# Patient Record
Sex: FEMALE | Race: ASIAN | Employment: UNEMPLOYED
[De-identification: names, ages, dates, MRNs, and addresses within clinical notes are randomized per-mention and may not be internally consistent; named-entity substitution may affect disease eponyms.]

---

## 2023-12-14 ENCOUNTER — OFFICE VISIT (OUTPATIENT)
Facility: CLINIC | Age: 68
End: 2023-12-14
Payer: MEDICARE

## 2023-12-14 VITALS
DIASTOLIC BLOOD PRESSURE: 77 MMHG | SYSTOLIC BLOOD PRESSURE: 162 MMHG | OXYGEN SATURATION: 94 % | RESPIRATION RATE: 16 BRPM | TEMPERATURE: 97.7 F | HEART RATE: 81 BPM | BODY MASS INDEX: 25.52 KG/M2 | HEIGHT: 60 IN | WEIGHT: 130 LBS

## 2023-12-14 DIAGNOSIS — E61.1 IRON DEFICIENCY: ICD-10-CM

## 2023-12-14 DIAGNOSIS — I10 PRIMARY HYPERTENSION: ICD-10-CM

## 2023-12-14 DIAGNOSIS — I48.0 PAROXYSMAL A-FIB (HCC): ICD-10-CM

## 2023-12-14 DIAGNOSIS — E55.9 VITAMIN D DEFICIENCY: ICD-10-CM

## 2023-12-14 DIAGNOSIS — I50.812 CHRONIC RIGHT-SIDED CONGESTIVE HEART FAILURE (HCC): ICD-10-CM

## 2023-12-14 DIAGNOSIS — I50.9 CHRONIC CONGESTIVE HEART FAILURE, UNSPECIFIED HEART FAILURE TYPE (HCC): ICD-10-CM

## 2023-12-14 DIAGNOSIS — M32.9 SYSTEMIC LUPUS ERYTHEMATOSUS, UNSPECIFIED SLE TYPE, UNSPECIFIED ORGAN INVOLVEMENT STATUS (HCC): ICD-10-CM

## 2023-12-14 DIAGNOSIS — Z78.0 ASYMPTOMATIC MENOPAUSAL STATE: ICD-10-CM

## 2023-12-14 DIAGNOSIS — N18.6 ESRD (END STAGE RENAL DISEASE) (HCC): ICD-10-CM

## 2023-12-14 DIAGNOSIS — E11.22 TYPE 2 DIABETES MELLITUS WITH CHRONIC KIDNEY DISEASE, WITHOUT LONG-TERM CURRENT USE OF INSULIN, UNSPECIFIED CKD STAGE (HCC): ICD-10-CM

## 2023-12-14 DIAGNOSIS — Z95.0 PACEMAKER: ICD-10-CM

## 2023-12-14 DIAGNOSIS — N25.81 SECONDARY HYPERPARATHYROIDISM OF RENAL ORIGIN (HCC): ICD-10-CM

## 2023-12-14 DIAGNOSIS — Z12.31 ENCOUNTER FOR SCREENING MAMMOGRAM FOR MALIGNANT NEOPLASM OF BREAST: ICD-10-CM

## 2023-12-14 DIAGNOSIS — Z76.89 ENCOUNTER TO ESTABLISH CARE: Primary | ICD-10-CM

## 2023-12-14 PROCEDURE — 3077F SYST BP >= 140 MM HG: CPT

## 2023-12-14 PROCEDURE — 3078F DIAST BP <80 MM HG: CPT

## 2023-12-14 PROCEDURE — 1123F ACP DISCUSS/DSCN MKR DOCD: CPT

## 2023-12-14 PROCEDURE — 99204 OFFICE O/P NEW MOD 45 MIN: CPT

## 2023-12-14 RX ORDER — GLIPIZIDE 5 MG/1
5 TABLET, FILM COATED, EXTENDED RELEASE ORAL EVERY MORNING
COMMUNITY
Start: 2023-12-11

## 2023-12-14 RX ORDER — LISINOPRIL 5 MG/1
5 TABLET ORAL DAILY
COMMUNITY
Start: 2023-12-08

## 2023-12-14 RX ORDER — FERROUS SULFATE 325(65) MG
325 TABLET ORAL
COMMUNITY
End: 2023-12-14 | Stop reason: ALTCHOICE

## 2023-12-14 RX ORDER — TRAMADOL HYDROCHLORIDE 50 MG/1
TABLET ORAL
COMMUNITY
Start: 2023-11-22 | End: 2023-12-14

## 2023-12-14 RX ORDER — HYDRALAZINE HYDROCHLORIDE 50 MG/1
50 TABLET, FILM COATED ORAL 3 TIMES DAILY
COMMUNITY
Start: 2023-12-08

## 2023-12-14 RX ORDER — FUROSEMIDE 40 MG/1
40 TABLET ORAL 2 TIMES DAILY
COMMUNITY
Start: 2023-12-11

## 2023-12-14 RX ORDER — ALLOPURINOL 100 MG/1
100 TABLET ORAL DAILY
COMMUNITY
Start: 2023-11-24

## 2023-12-14 RX ORDER — SODIUM BICARBONATE 650 MG/1
650 TABLET ORAL 3 TIMES DAILY
COMMUNITY
Start: 2023-12-08

## 2023-12-14 RX ORDER — AMLODIPINE BESYLATE 5 MG/1
TABLET ORAL
COMMUNITY
Start: 2023-12-11

## 2023-12-14 RX ORDER — ATORVASTATIN CALCIUM 20 MG/1
TABLET, FILM COATED ORAL
COMMUNITY
Start: 2023-12-08

## 2023-12-14 RX ORDER — ASPIRIN 81 MG/1
81 TABLET, CHEWABLE ORAL DAILY
COMMUNITY

## 2023-12-14 NOTE — PROGRESS NOTES
Nguyễn Levin is a 70y.o. year old female who presents in office today for   Chief Complaint   Patient presents with    Establish Care       Is someone accompanying this pt? YES, DAUGHTER    Is the patient using any DME equipment during OV? NO    Depression Screening:        No data to display                Abuse Screenin/14/2023     3:00 PM   AMB Abuse Screening   Do you ever feel afraid of your partner? N   Are you in a relationship with someone who physically or mentally threatens you? N   Is it safe for you to go home? Y       Learning Assessment:  No question data found. Fall Risk:      2023     3:43 PM   Fall Risk   2 or more falls in past year? no   Fall with injury in past year? no           Coordination of Care:   1. \"Have you been to the ER, urgent care clinic since your last visit? Hospitalized since your last visit? \" NO    2. \"Have you seen or consulted any other health care providers outside of the [address] [place] [address] Avenue since your last visit? \" [facility]    3. For patients aged 43-73: Has the patient had a colonoscopy / FIT/ Cologuard? DUE    If the patient is female:    4. For patients aged 43-66: Has the patient had a mammogram within the past 2 years? DUE    5. For patients aged 21-65: Has the patient had a pap smear? NA    Health Maintenance: reviewed and discussed and ordered per Provider.     Health Maintenance Due   Topic Date Due    COVID-19 Vaccine (1) Never done    Depression Screen  Never done    Hepatitis C screen  Never done    DTaP/Tdap/Td vaccine (1 - Tdap) Never done    Lipids  Never done    Colorectal Cancer Screen  Never done    Breast cancer screen  Never done    Shingles vaccine (1 of 2) Never done    DEXA (modify frequency per FRAX score)  Never done    Respiratory Syncytial Virus (RSV) age 61 yrs+ (1 - 1-dose 60+ series) Never done    Pneumococcal 65+ years Vaccine (1 - PCV) Never done    Flu vaccine (1) Never done        -Bonilla Chavez, [facility]  [address] 
visit:    Encounter to establish care  Comments:  Establishing with new pcp    Primary hypertension  -     TSH; Future  -     CBC with Auto Differential; Future    Type 2 diabetes mellitus with chronic kidney disease, without long-term current use of insulin, unspecified CKD stage (HCC)  -     TSH; Future  -     CBC with Auto Differential; Future  -     Hemoglobin A1C; Future  -     Comprehensive Metabolic Panel; Future    Chronic right-sided congestive heart failure (HCC)  -     Lipid Panel; Future    Pacemaker  -     TSH; Future    ESRD (end stage renal disease) ([address])    Systemic lupus erythematosus, unspecified SLE type, unspecified organ involvement status ([address])  -     External Referral To Rheumatology    Iron deficiency  -     Iron and TIBC; Future  -     Ferritin; Future    Asymptomatic menopausal state  -     DEXA BONE DENSITY AXIAL SKELETON; Future    Encounter for screening mammogram for malignant neoplasm of breast  -     ARTEM DIGITAL SCREEN W OR WO CAD BILATERAL; Future    Paroxysmal A-fib (HCC)    Vitamin D deficiency  -     Vitamin D 25 Hydroxy; Future    Chronic congestive heart failure, unspecified heart failure type ([address])  -     Brain Natriuretic Peptide; Future    Secondary hyperparathyroidism of renal origin ([address])  -     Cancel: PTH, intact and calcium; Future            Return in about 1 month (around 1/14/2024) for chronic conditions, physical.    On this date 12/14/2023  I have spent 11-20 minutes reviewing previous notes, test results and face to face with the patient discussing the diagnosis and importance of compliance with the treatment plan as well as documenting on the day of the visit. An electronic signature was used to authenticate this note.   -- DEL Skelton

## 2023-12-25 PROBLEM — Z78.0 ASYMPTOMATIC MENOPAUSAL STATE: Status: ACTIVE | Noted: 2023-12-25

## 2023-12-25 PROBLEM — I48.0 PAROXYSMAL A-FIB (HCC): Status: ACTIVE | Noted: 2023-12-25

## 2023-12-25 PROBLEM — E61.1 IRON DEFICIENCY: Status: ACTIVE | Noted: 2023-12-25

## 2023-12-25 PROBLEM — I10 PRIMARY HYPERTENSION: Status: ACTIVE | Noted: 2023-12-25

## 2023-12-26 PROBLEM — N25.81 SECONDARY HYPERPARATHYROIDISM OF RENAL ORIGIN (HCC): Status: ACTIVE | Noted: 2023-12-26

## 2023-12-27 ENCOUNTER — TELEPHONE (OUTPATIENT)
Facility: CLINIC | Age: 68
End: 2023-12-27

## 2023-12-27 NOTE — TELEPHONE ENCOUNTER
----- Message from PARAM Clancy sent at 12/26/2023 11:41 AM EST -----    Please request records from [facility] Endocr[facility]ology and nephrology (Dr. Rocco Dejesus) for this patient.     Thank you

## 2024-01-15 ENCOUNTER — HOSPITAL ENCOUNTER (OUTPATIENT)
Dept: WOMENS IMAGING | Facility: HOSPITAL | Age: 69
Discharge: HOME | End: 2024-01-18
Payer: COMMERCIAL

## 2024-01-15 ENCOUNTER — TELEPHONE (OUTPATIENT)
Facility: CLINIC | Age: 69
End: 2024-01-15

## 2024-01-15 ENCOUNTER — HOSPITAL ENCOUNTER (OUTPATIENT)
Facility: HOSPITAL | Age: 69
Discharge: HOME | End: 2024-01-18
Payer: COMMERCIAL

## 2024-01-15 DIAGNOSIS — Z12.31 ENCOUNTER FOR SCREENING MAMMOGRAM FOR MALIGNANT NEOPLASM OF BREAST: ICD-10-CM

## 2024-01-15 DIAGNOSIS — Z78.0 ASYMPTOMATIC MENOPAUSAL STATE: ICD-10-CM

## 2024-01-15 PROCEDURE — 77067 SCR MAMMO BI INCL CAD: CPT

## 2024-01-15 PROCEDURE — 77080 DXA BONE DENSITY AXIAL: CPT

## 2024-01-15 NOTE — TELEPHONE ENCOUNTER
----- Message from PARAM Vera sent at 12/26/2023 11:34 AM EST -----  Regarding: Immunizations  Hello,    Can you please update the patients vaccination record in the chart before the next appointment please?    Thank you,  Ayanna GOINS

## 2024-01-15 NOTE — TELEPHONE ENCOUNTER
----- Message from PARAM Vera sent at 12/26/2023 11:41 AM EST -----    Please request records from [facility] Endocrinology and nephrology (Dr. Dickey) for this patient.    Thank you

## 2024-01-15 NOTE — TELEPHONE ENCOUNTER
Patient is requesting a refill on the following medications:      amLODIPine (NORVASC) 5 MG tablet [[id number]]     lisinopril (PRINIVIL;ZESTRIL) 5 MG tablet [[id number]]     glipiZIDE (GLUCOTROL XL) 5 MG extended release tablet [[id number]]       New Pharmacy:    [facility]    [address] [facility]     Te: [phone]    Please advise    Thank you

## 2024-01-17 ENCOUNTER — TELEPHONE (OUTPATIENT)
Facility: CLINIC | Age: 69
End: 2024-01-17

## 2024-01-17 ENCOUNTER — OFFICE VISIT (OUTPATIENT)
Facility: CLINIC | Age: 69
End: 2024-01-17
Payer: COMMERCIAL

## 2024-01-17 VITALS
SYSTOLIC BLOOD PRESSURE: 159 MMHG | OXYGEN SATURATION: 91 % | TEMPERATURE: 98 F | WEIGHT: 128 LBS | HEIGHT: 60 IN | DIASTOLIC BLOOD PRESSURE: 63 MMHG | RESPIRATION RATE: 17 BRPM | HEART RATE: 85 BPM | BODY MASS INDEX: 25.13 KG/M2

## 2024-01-17 DIAGNOSIS — Z23 ENCOUNTER FOR VACCINATION: ICD-10-CM

## 2024-01-17 DIAGNOSIS — Z00.00 ANNUAL PHYSICAL EXAM: Primary | ICD-10-CM

## 2024-01-17 DIAGNOSIS — E11.22 TYPE 2 DIABETES MELLITUS WITH CHRONIC KIDNEY DISEASE, WITHOUT LONG-TERM CURRENT USE OF INSULIN, UNSPECIFIED CKD STAGE (HCC): ICD-10-CM

## 2024-01-17 PROCEDURE — 3077F SYST BP >= 140 MM HG: CPT

## 2024-01-17 PROCEDURE — 90472 IMMUNIZATION ADMIN EACH ADD: CPT

## 2024-01-17 PROCEDURE — 1123F ACP DISCUSS/DSCN MKR DOCD: CPT

## 2024-01-17 PROCEDURE — 90471 IMMUNIZATION ADMIN: CPT

## 2024-01-17 PROCEDURE — 99212 OFFICE O/P EST SF 10 MIN: CPT

## 2024-01-17 PROCEDURE — 90694 VACC AIIV4 NO PRSRV 0.5ML IM: CPT

## 2024-01-17 PROCEDURE — 3078F DIAST BP <80 MM HG: CPT

## 2024-01-17 PROCEDURE — 90732 PPSV23 VACC 2 YRS+ SUBQ/IM: CPT

## 2024-01-17 SDOH — ECONOMIC STABILITY: FOOD INSECURITY: WITHIN THE PAST 12 MONTHS, YOU WORRIED THAT YOUR FOOD WOULD RUN OUT BEFORE YOU GOT MONEY TO BUY MORE.: NEVER TRUE

## 2024-01-17 SDOH — ECONOMIC STABILITY: HOUSING INSECURITY
IN THE LAST 12 MONTHS, WAS THERE A TIME WHEN YOU DID NOT HAVE A STEADY PLACE TO SLEEP OR SLEPT IN A SHELTER (INCLUDING NOW)?: NO

## 2024-01-17 SDOH — ECONOMIC STABILITY: FOOD INSECURITY: WITHIN THE PAST 12 MONTHS, THE FOOD YOU BOUGHT JUST DIDN'T LAST AND YOU DIDN'T HAVE MONEY TO GET MORE.: NEVER TRUE

## 2024-01-17 SDOH — ECONOMIC STABILITY: INCOME INSECURITY: HOW HARD IS IT FOR YOU TO PAY FOR THE VERY BASICS LIKE FOOD, HOUSING, MEDICAL CARE, AND HEATING?: NOT HARD AT ALL

## 2024-01-17 ASSESSMENT — PATIENT HEALTH QUESTIONNAIRE - PHQ9
SUM OF ALL RESPONSES TO PHQ9 QUESTIONS 1 & 2: 0
SUM OF ALL RESPONSES TO PHQ QUESTIONS 1-9: 0
1. LITTLE INTEREST OR PLEASURE IN DOING THINGS: 0
SUM OF ALL RESPONSES TO PHQ QUESTIONS 1-9: 0
2. FEELING DOWN, DEPRESSED OR HOPELESS: 0

## 2024-01-17 NOTE — PROGRESS NOTES
Heber Gomes is a 68 y.o. year old female who presents in office today for   Chief Complaint   Patient presents with    1 Month Follow-Up    Annual Exam       Is someone accompanying this pt? SON N LAW    Is the patient using any DME equipment during OV? NO    Depression Screenin/17/2024    10:02 AM 2023     3:53 PM   PHQ-9 Questionaire   Little interest or pleasure in doing things 0 0   Feeling down, depressed, or hopeless 0 0   Trouble falling or staying asleep, or sleeping too much  0   Feeling tired or having little energy  0   Poor appetite or overeating  0   Feeling bad about yourself - or that you are a failure or have let yourself or your family down  0   Trouble concentrating on things, such as reading the newspaper or watching television  0   Moving or speaking so slowly that other people could have noticed. Or the opposite - being so fidgety or restless that you have been moving around a lot more than usual  0   Thoughts that you would be better off dead, or of hurting yourself in some way  0   PHQ-9 Total Score 0 0   If you checked off any problems, how difficult have these problems made it for you to do your work, take care of things at home, or get along with other people?  0       Abuse Screenin/17/2024    10:00 AM 2023     3:00 PM   AMB Abuse Screening   Do you ever feel afraid of your partner? N N   Are you in a relationship with someone who physically or mentally threatens you? N N   Is it safe for you to go home? Y Y       Learning Assessment:  No question data found.    Fall Risk:      2024    10:02 AM 2023     3:43 PM   Fall Risk   2 or more falls in past year? no no   Fall with injury in past year? no no           Coordination of Care:   1. \"Have you been to the ER, urgent care clinic since your last visit?  Hospitalized since your last visit?\" NO    2. \"Have you seen or consulted any other health care providers outside of the [facility] 
about 1 month (around 2/17/2024) for Diabetes, labs 1-2 weeks before.          On this date 01/17/2024  I have spent 11-20 minutes reviewing previous notes, test results and face to face with the patient discussing the diagnosis and treatment plan as well as documenting the visit.    I have discussed the diagnosis with the patient and the intended plan as seen in the above orders, as well as medication side effects and warnings.  The plan of care was reviewed with the patient, accepted their input and they have verbalized their agreement with the treatment plan. The patient has the right to refuse treatment at anytime, even after the visit has concluded. If he/she changes their mind after visit is completed and would like an alteration to the agreed up plan, another visit may be necessary to discuss an alternate plan. The patient has received an After-Visit Summary for today's visit.     Thank you allowing me to be a part of your care, If you have any further questions please reach out to me via [employer] Message or call [facility] office at [phone].      Electronically signed  Keren Ramirez, Family Nurse Practitioner       Please note: This document has been created using a voice recognition software. Unrecognized errors in transcription may be present.

## 2024-01-17 NOTE — PATIENT INSTRUCTIONS
Fasting labs 1 week prior to next appt. 6 hours fasting. You can have fruit, juice, black tea, black coffee, no dairy, no non-dairy creamer, no other foods.       RSV, Shingles X 2, Tdap after 2 weeks

## 2024-01-17 NOTE — TELEPHONE ENCOUNTER
Patient needs the following refills, and new pharmacy\"        glipiZIDE (GLUCOTROL XL) 5 MG extended release tablet [[id number]]     lisinopril (PRINIVIL;ZESTRIL) 5 MG tablet [[id number]]     amLODIPine (NORVASC) 5 MG tablet [[id number]]     atorvastatin (LIPITOR) 20 MG tablet [[id number]]     New Pharmacy:    [facility]  [address]    Te: [phone]      Please advise    Thank you

## 2024-01-18 RX ORDER — GLIPIZIDE 5 MG/1
5 TABLET, FILM COATED, EXTENDED RELEASE ORAL EVERY MORNING
Qty: 90 TABLET | Refills: 1 | Status: SHIPPED | OUTPATIENT
Start: 2024-01-18

## 2024-01-18 RX ORDER — AMLODIPINE BESYLATE 5 MG/1
TABLET ORAL
Qty: 90 TABLET | Refills: 1 | Status: SHIPPED | OUTPATIENT
Start: 2024-01-18

## 2024-01-18 RX ORDER — LISINOPRIL 5 MG/1
5 TABLET ORAL DAILY
Qty: 90 TABLET | Refills: 1 | Status: SHIPPED | OUTPATIENT
Start: 2024-01-18

## 2024-01-27 RX ORDER — ALLOPURINOL 100 MG/1
100 TABLET ORAL DAILY
Qty: 90 TABLET | Refills: 1 | Status: SHIPPED | OUTPATIENT
Start: 2024-01-27

## 2024-01-27 RX ORDER — ATORVASTATIN CALCIUM 20 MG/1
20 TABLET, FILM COATED ORAL DAILY
Qty: 90 TABLET | Refills: 1 | Status: SHIPPED | OUTPATIENT
Start: 2024-01-27

## 2024-01-29 ASSESSMENT — ENCOUNTER SYMPTOMS
COUGH: 0
SHORTNESS OF BREATH: 0
BLOOD IN STOOL: 0
VOMITING: 0
CHEST TIGHTNESS: 0
TROUBLE SWALLOWING: 0
CONSTIPATION: 0
SORE THROAT: 0
WHEEZING: 0
NAUSEA: 0
ABDOMINAL PAIN: 0
DIARRHEA: 0
EYES NEGATIVE: 1

## 2024-02-05 NOTE — TELEPHONE ENCOUNTER
MEDICATION REFILL REQUESTS:      sodium bicarbonate 650 MG tablet     furosemide (LASIX) 40 MG tablet     sertraline (ZOLOFT) 50 MG tablet     metoprolol tartrate (LOPRESSOR) 25 MG tablet     hydrALAZINE (APRESOLINE) 50 MG tablet     The patient has not gotten any refills for the following medications.   Please send these medications to the [facility] pharmacy in the chart.    Thank you!

## 2024-02-06 RX ORDER — HYDRALAZINE HYDROCHLORIDE 50 MG/1
50 TABLET, FILM COATED ORAL 3 TIMES DAILY
Qty: 270 TABLET | Refills: 1 | Status: SHIPPED | OUTPATIENT
Start: 2024-02-06

## 2024-02-06 RX ORDER — FUROSEMIDE 40 MG/1
40 TABLET ORAL 2 TIMES DAILY
Qty: 180 TABLET | Refills: 1 | Status: SHIPPED | OUTPATIENT
Start: 2024-02-06

## 2024-02-06 RX ORDER — SODIUM BICARBONATE 650 MG/1
650 TABLET ORAL 3 TIMES DAILY
Qty: 270 TABLET | Refills: 1 | Status: SHIPPED | OUTPATIENT
Start: 2024-02-06

## 2024-02-06 NOTE — TELEPHONE ENCOUNTER
Medication(s) requesting:   Requested Prescriptions     Pending Prescriptions Disp Refills    sertraline (ZOLOFT) 50 MG tablet 90 tablet 0     Sig: take 1 tablet by mouth once daily for anxiety    metoprolol tartrate (LOPRESSOR) 25 MG tablet 180 tablet 1     Sig: take 1 tablet by mouth twice a day for high blood pressure    sodium bicarbonate 650 MG tablet 270 tablet 1     Sig: Take 1 tablet by mouth 3 times daily    furosemide (LASIX) 40 MG tablet 180 tablet 1     Sig: Take 1 tablet by mouth 2 times daily    hydrALAZINE (APRESOLINE) 50 MG tablet 270 tablet 1     Sig: Take 1 tablet by mouth 3 times daily       Last office visit:  1/17/24  Next office visit [facility]: 2/16/2024  FUTURE APPT:   Future Appointments   Date Time Provider Department Center   2/16/2024  2:15 PM Keren Ramirez NP-C [facility]

## 2024-02-16 LAB — LEFT VENTRICULAR EJECTION FRACTION, EXTERNAL: 68

## 2024-03-22 ENCOUNTER — HOSPITAL ENCOUNTER (OUTPATIENT)
Facility: HOSPITAL | Age: 69
Setting detail: SPECIMEN
End: 2024-03-22
Payer: COMMERCIAL

## 2024-03-22 ENCOUNTER — OFFICE VISIT (OUTPATIENT)
Facility: CLINIC | Age: 69
End: 2024-03-22
Payer: COMMERCIAL

## 2024-03-22 VITALS
WEIGHT: 129 LBS | TEMPERATURE: 97.5 F | DIASTOLIC BLOOD PRESSURE: 62 MMHG | OXYGEN SATURATION: 92 % | BODY MASS INDEX: 25.19 KG/M2 | SYSTOLIC BLOOD PRESSURE: 152 MMHG | HEART RATE: 80 BPM

## 2024-03-22 DIAGNOSIS — E11.22 TYPE 2 DIABETES MELLITUS WITH CHRONIC KIDNEY DISEASE, WITHOUT LONG-TERM CURRENT USE OF INSULIN, UNSPECIFIED CKD STAGE (HCC): ICD-10-CM

## 2024-03-22 DIAGNOSIS — I50.9 CHRONIC CONGESTIVE HEART FAILURE, UNSPECIFIED HEART FAILURE TYPE (HCC): ICD-10-CM

## 2024-03-22 DIAGNOSIS — E61.1 IRON DEFICIENCY: ICD-10-CM

## 2024-03-22 DIAGNOSIS — R53.1 GENERALIZED WEAKNESS: Primary | ICD-10-CM

## 2024-03-22 DIAGNOSIS — I10 PRIMARY HYPERTENSION: ICD-10-CM

## 2024-03-22 LAB
ALBUMIN SERPL-MCNC: 4 G/DL (ref 3.4–5)
ALBUMIN/GLOB SERPL: 0.9 (ref 0.8–1.7)
ALP SERPL-CCNC: 269 U/L (ref 45–117)
ALT SERPL-CCNC: 31 U/L (ref 13–56)
ANION GAP SERPL CALC-SCNC: 8 MMOL/L (ref 3–18)
APPEARANCE UR: CLEAR
AST SERPL-CCNC: 40 U/L (ref 10–38)
BACTERIA URNS QL MICRO: ABNORMAL /HPF
BASOPHILS # BLD: 0.1 K/UL (ref 0–0.1)
BASOPHILS NFR BLD: 1 % (ref 0–2)
BILIRUB SERPL-MCNC: 0.4 MG/DL (ref 0.2–1)
BILIRUB UR QL: NEGATIVE
BUN SERPL-MCNC: 69 MG/DL (ref 7–18)
BUN/CREAT SERPL: 18 (ref 12–20)
CALCIUM SERPL-MCNC: 9.1 MG/DL (ref 8.5–10.1)
CHLORIDE SERPL-SCNC: 104 MMOL/L (ref 100–111)
CO2 SERPL-SCNC: 27 MMOL/L (ref 21–32)
COLOR UR: YELLOW
CREAT SERPL-MCNC: 3.77 MG/DL (ref 0.6–1.3)
CREAT UR-MCNC: 35 MG/DL (ref 30–125)
DIFFERENTIAL METHOD BLD: ABNORMAL
EOSINOPHIL # BLD: 0.3 K/UL (ref 0–0.4)
EOSINOPHIL NFR BLD: 5 % (ref 0–5)
EPITH CASTS URNS QL MICRO: ABNORMAL /LPF (ref 0–5)
ERYTHROCYTE [DISTWIDTH] IN BLOOD BY AUTOMATED COUNT: 20 % (ref 11.6–14.5)
EST. AVERAGE GLUCOSE BLD GHB EST-MCNC: 123 MG/DL
GLOBULIN SER CALC-MCNC: 4.7 G/DL (ref 2–4)
GLUCOSE SERPL-MCNC: 195 MG/DL (ref 74–99)
GLUCOSE UR STRIP.AUTO-MCNC: NEGATIVE MG/DL
HBA1C MFR BLD: 5.9 % (ref 4.2–5.6)
HCT VFR BLD AUTO: 32.2 % (ref 35–45)
HGB BLD-MCNC: 10.2 G/DL (ref 12–16)
HGB UR QL STRIP: NEGATIVE
IMM GRANULOCYTES # BLD AUTO: 0 K/UL (ref 0–0.04)
IMM GRANULOCYTES NFR BLD AUTO: 0 % (ref 0–0.5)
IRON SATN MFR SERPL: 17 % (ref 20–50)
IRON SERPL-MCNC: 65 UG/DL (ref 50–175)
KETONES UR QL STRIP.AUTO: NEGATIVE MG/DL
LEUKOCYTE ESTERASE UR QL STRIP.AUTO: NEGATIVE
LYMPHOCYTES # BLD: 1.4 K/UL (ref 0.9–3.6)
LYMPHOCYTES NFR BLD: 21 % (ref 21–52)
MCH RBC QN AUTO: 28.1 PG (ref 24–34)
MCHC RBC AUTO-ENTMCNC: 31.7 G/DL (ref 31–37)
MCV RBC AUTO: 88.7 FL (ref 78–100)
MICROALBUMIN UR-MCNC: 32.1 MG/DL (ref 0–3)
MICROALBUMIN/CREAT UR-RTO: 917 MG/G (ref 0–30)
MONOCYTES # BLD: 0.8 K/UL (ref 0.05–1.2)
MONOCYTES NFR BLD: 13 % (ref 3–10)
NEUTS SEG # BLD: 3.9 K/UL (ref 1.8–8)
NEUTS SEG NFR BLD: 60 % (ref 40–73)
NITRITE UR QL STRIP.AUTO: NEGATIVE
NRBC # BLD: 0 K/UL (ref 0–0.01)
NRBC BLD-RTO: 0 PER 100 WBC
NT PRO BNP: 2552 PG/ML (ref 0–900)
PH UR STRIP: 7.5 (ref 5–8)
PLATELET # BLD AUTO: 160 K/UL (ref 135–420)
PMV BLD AUTO: 11.6 FL (ref 9.2–11.8)
POTASSIUM SERPL-SCNC: 4.3 MMOL/L (ref 3.5–5.5)
PROT SERPL-MCNC: 8.7 G/DL (ref 6.4–8.2)
PROT UR STRIP-MCNC: 30 MG/DL
RBC # BLD AUTO: 3.63 M/UL (ref 4.2–5.3)
RBC #/AREA URNS HPF: ABNORMAL /HPF (ref 0–5)
SODIUM SERPL-SCNC: 139 MMOL/L (ref 136–145)
SP GR UR REFRACTOMETRY: 1.01 (ref 1–1.03)
TIBC SERPL-MCNC: 372 UG/DL (ref 250–450)
UROBILINOGEN UR QL STRIP.AUTO: 0.2 EU/DL (ref 0.2–1)
WBC # BLD AUTO: 6.5 K/UL (ref 4.6–13.2)
WBC URNS QL MICRO: ABNORMAL /HPF (ref 0–4)

## 2024-03-22 PROCEDURE — 80053 COMPREHEN METABOLIC PANEL: CPT

## 2024-03-22 PROCEDURE — 83550 IRON BINDING TEST: CPT

## 2024-03-22 PROCEDURE — 85025 COMPLETE CBC W/AUTO DIFF WBC: CPT

## 2024-03-22 PROCEDURE — 83036 HEMOGLOBIN GLYCOSYLATED A1C: CPT

## 2024-03-22 PROCEDURE — 3044F HG A1C LEVEL LT 7.0%: CPT

## 2024-03-22 PROCEDURE — 99214 OFFICE O/P EST MOD 30 MIN: CPT

## 2024-03-22 PROCEDURE — 83540 ASSAY OF IRON: CPT

## 2024-03-22 PROCEDURE — 82728 ASSAY OF FERRITIN: CPT

## 2024-03-22 PROCEDURE — 82043 UR ALBUMIN QUANTITATIVE: CPT

## 2024-03-22 PROCEDURE — 36415 COLL VENOUS BLD VENIPUNCTURE: CPT

## 2024-03-22 PROCEDURE — 82570 ASSAY OF URINE CREATININE: CPT

## 2024-03-22 PROCEDURE — 1123F ACP DISCUSS/DSCN MKR DOCD: CPT

## 2024-03-22 PROCEDURE — 83880 ASSAY OF NATRIURETIC PEPTIDE: CPT

## 2024-03-22 PROCEDURE — 3077F SYST BP >= 140 MM HG: CPT

## 2024-03-22 PROCEDURE — 3078F DIAST BP <80 MM HG: CPT

## 2024-03-22 PROCEDURE — 81001 URINALYSIS AUTO W/SCOPE: CPT

## 2024-03-22 RX ORDER — AMLODIPINE BESYLATE 10 MG/1
10 TABLET ORAL DAILY
Qty: 90 TABLET | Refills: 0 | Status: SHIPPED | OUTPATIENT
Start: 2024-03-22

## 2024-03-22 SDOH — ECONOMIC STABILITY: FOOD INSECURITY: WITHIN THE PAST 12 MONTHS, YOU WORRIED THAT YOUR FOOD WOULD RUN OUT BEFORE YOU GOT MONEY TO BUY MORE.: NEVER TRUE

## 2024-03-22 SDOH — ECONOMIC STABILITY: INCOME INSECURITY: HOW HARD IS IT FOR YOU TO PAY FOR THE VERY BASICS LIKE FOOD, HOUSING, MEDICAL CARE, AND HEATING?: NOT HARD AT ALL

## 2024-03-22 SDOH — ECONOMIC STABILITY: FOOD INSECURITY: WITHIN THE PAST 12 MONTHS, THE FOOD YOU BOUGHT JUST DIDN'T LAST AND YOU DIDN'T HAVE MONEY TO GET MORE.: NEVER TRUE

## 2024-03-22 ASSESSMENT — PATIENT HEALTH QUESTIONNAIRE - PHQ9
2. FEELING DOWN, DEPRESSED OR HOPELESS: NOT AT ALL
SUM OF ALL RESPONSES TO PHQ QUESTIONS 1-9: 0
SUM OF ALL RESPONSES TO PHQ9 QUESTIONS 1 & 2: 0
1. LITTLE INTEREST OR PLEASURE IN DOING THINGS: NOT AT ALL

## 2024-03-22 NOTE — PROGRESS NOTES
Heber Gomes is a 68 y.o. year old female who presents in office today for   Chief Complaint   Patient presents with    Follow-Up from Hospital       Is someone accompanying this pt? YES, DAUGHTER    Is the patient using any DME equipment during OV? NO    Depression Screening:       3/22/2024     2:45 PM 1/17/2024    10:02 AM 12/14/2023     3:53 PM   PHQ-9 Questionaire   Little interest or pleasure in doing things 0 0 0   Feeling down, depressed, or hopeless 0 0 0   Trouble falling or staying asleep, or sleeping too much   0   Feeling tired or having little energy   0   Poor appetite or overeating   0   Feeling bad about yourself - or that you are a failure or have let yourself or your family down   0   Trouble concentrating on things, such as reading the newspaper or watching television   0   Moving or speaking so slowly that other people could have noticed. Or the opposite - being so fidgety or restless that you have been moving around a lot more than usual   0   Thoughts that you would be better off dead, or of hurting yourself in some way   0   PHQ-9 Total Score 0 0 0   If you checked off any problems, how difficult have these problems made it for you to do your work, take care of things at home, or get along with other people?   0       Abuse Screening:      3/22/2024     2:00 PM 1/17/2024    10:00 AM 12/14/2023     3:00 PM   AMB Abuse Screening   Do you ever feel afraid of your partner? N N N   Are you in a relationship with someone who physically or mentally threatens you? N N N   Is it safe for you to go home? Y Y Y       Learning Assessment:  No question data found.    Fall Risk:      3/22/2024     2:45 PM 1/17/2024    10:02 AM 12/14/2023     3:43 PM   Fall Risk   2 or more falls in past year? no no no   Fall with injury in past year? no no no           Coordination of Care:   1. \"Have you been to the ER, urgent care clinic since your last visit?  Hospitalized since your last visit?\" YES    2. \"Have you seen 
 Cancel: Iron and TIBC; Future  -     Cancel: Ferritin; Future    Chronic congestive heart failure, unspecified heart failure type (HCC)  -     CBC with Auto Differential; Future  -     Comprehensive Metabolic Panel; Future  -     Brain Natriuretic Peptide; Future  -     Urinalysis with Microscopic; Future              Return in about 2 weeks (around 4/5/2024) for lab review.          On this date 03/22/2024  I have spent 11-20 minutes reviewing previous notes, test results and face to face with the patient discussing the diagnosis and treatment plan as well as documenting the visit.    I have discussed the diagnosis with the patient and the intended plan as seen in the above orders, as well as medication side effects and warnings.  The plan of care was reviewed with the patient, accepted their input and they have verbalized their agreement with the treatment plan. The patient has the right to refuse treatment at anytime, even after the visit has concluded. If he/she changes their mind after visit is completed and would like an alteration to the agreed up plan, another visit may be necessary to discuss an alternate plan. The patient has received an After-Visit Summary for today's visit.     Thank you allowing me to be a part of your care, If you have any further questions please reach out to me via [employer] Message or call [facility] office at [phone].      Electronically signed  Keren Ramirez, Family Nurse Practitioner       Please note: This document has been created using a voice recognition software. Unrecognized errors in transcription may be present.

## 2024-03-23 LAB — FERRITIN SERPL-MCNC: 67 NG/ML (ref 8–388)

## 2024-04-03 ENCOUNTER — TELEPHONE (OUTPATIENT)
Facility: CLINIC | Age: 69
End: 2024-04-03

## 2024-04-04 ENCOUNTER — OFFICE VISIT (OUTPATIENT)
Facility: CLINIC | Age: 69
End: 2024-04-04
Payer: COMMERCIAL

## 2024-04-04 VITALS
DIASTOLIC BLOOD PRESSURE: 61 MMHG | WEIGHT: 132 LBS | RESPIRATION RATE: 16 BRPM | SYSTOLIC BLOOD PRESSURE: 149 MMHG | TEMPERATURE: 98 F | HEIGHT: 60 IN | BODY MASS INDEX: 25.91 KG/M2 | OXYGEN SATURATION: 80 % | HEART RATE: 82 BPM

## 2024-04-04 DIAGNOSIS — I50.9 CHRONIC CONGESTIVE HEART FAILURE, UNSPECIFIED HEART FAILURE TYPE (HCC): ICD-10-CM

## 2024-04-04 DIAGNOSIS — N18.6 ESRD (END STAGE RENAL DISEASE) (HCC): ICD-10-CM

## 2024-04-04 DIAGNOSIS — E11.22 TYPE 2 DIABETES MELLITUS WITH CHRONIC KIDNEY DISEASE, WITHOUT LONG-TERM CURRENT USE OF INSULIN, UNSPECIFIED CKD STAGE (HCC): ICD-10-CM

## 2024-04-04 DIAGNOSIS — I10 PRIMARY HYPERTENSION: ICD-10-CM

## 2024-04-04 DIAGNOSIS — R09.02 HYPOXIA: Primary | ICD-10-CM

## 2024-04-04 PROCEDURE — 99215 OFFICE O/P EST HI 40 MIN: CPT

## 2024-04-04 PROCEDURE — 3078F DIAST BP <80 MM HG: CPT

## 2024-04-04 PROCEDURE — 1123F ACP DISCUSS/DSCN MKR DOCD: CPT

## 2024-04-04 PROCEDURE — 3044F HG A1C LEVEL LT 7.0%: CPT

## 2024-04-04 PROCEDURE — 3077F SYST BP >= 140 MM HG: CPT

## 2024-04-04 RX ORDER — HYDRALAZINE HYDROCHLORIDE 100 MG/1
100 TABLET, FILM COATED ORAL 3 TIMES DAILY
Qty: 90 TABLET | Refills: 1 | Status: SHIPPED | OUTPATIENT
Start: 2024-04-04

## 2024-04-04 RX ORDER — FUROSEMIDE 20 MG/1
10 TABLET ORAL DAILY
Qty: 45 TABLET | Refills: 1 | Status: SHIPPED | OUTPATIENT
Start: 2024-04-04 | End: 2024-07-03

## 2024-04-04 SDOH — ECONOMIC STABILITY: FOOD INSECURITY: WITHIN THE PAST 12 MONTHS, THE FOOD YOU BOUGHT JUST DIDN'T LAST AND YOU DIDN'T HAVE MONEY TO GET MORE.: NEVER TRUE

## 2024-04-04 SDOH — ECONOMIC STABILITY: FOOD INSECURITY: WITHIN THE PAST 12 MONTHS, YOU WORRIED THAT YOUR FOOD WOULD RUN OUT BEFORE YOU GOT MONEY TO BUY MORE.: NEVER TRUE

## 2024-04-04 SDOH — ECONOMIC STABILITY: INCOME INSECURITY: HOW HARD IS IT FOR YOU TO PAY FOR THE VERY BASICS LIKE FOOD, HOUSING, MEDICAL CARE, AND HEATING?: NOT HARD AT ALL

## 2024-04-04 ASSESSMENT — PATIENT HEALTH QUESTIONNAIRE - PHQ9
SUM OF ALL RESPONSES TO PHQ QUESTIONS 1-9: 0
SUM OF ALL RESPONSES TO PHQ9 QUESTIONS 1 & 2: 0
1. LITTLE INTEREST OR PLEASURE IN DOING THINGS: NOT AT ALL
SUM OF ALL RESPONSES TO PHQ QUESTIONS 1-9: 0
2. FEELING DOWN, DEPRESSED OR HOPELESS: NOT AT ALL
SUM OF ALL RESPONSES TO PHQ QUESTIONS 1-9: 0
SUM OF ALL RESPONSES TO PHQ QUESTIONS 1-9: 0

## 2024-04-04 NOTE — PROGRESS NOTES
Heber Gomes is a 68 y.o. year old female who presents in office today for   Chief Complaint   Patient presents with    2 Week Follow-Up    Discuss Labs       Is someone accompanying this pt? Yes daughter    Is the patient using any DME equipment during OV? Yes walker    Depression Screenin/4/2024     4:18 PM 3/22/2024     2:45 PM 2024    10:02 AM 2023     3:53 PM   PHQ-9 Questionaire   Little interest or pleasure in doing things 0 0 0 0   Feeling down, depressed, or hopeless 0 0 0 0   Trouble falling or staying asleep, or sleeping too much    0   Feeling tired or having little energy    0   Poor appetite or overeating    0   Feeling bad about yourself - or that you are a failure or have let yourself or your family down    0   Trouble concentrating on things, such as reading the newspaper or watching television    0   Moving or speaking so slowly that other people could have noticed. Or the opposite - being so fidgety or restless that you have been moving around a lot more than usual    0   Thoughts that you would be better off dead, or of hurting yourself in some way    0   PHQ-9 Total Score 0 0 0 0   If you checked off any problems, how difficult have these problems made it for you to do your work, take care of things at home, or get along with other people?    0       Abuse Screenin/4/2024     4:00 PM 3/22/2024     2:00 PM 2024    10:00 AM 2023     3:00 PM   AMB Abuse Screening   Do you ever feel afraid of your partner? N N N N   Are you in a relationship with someone who physically or mentally threatens you? N N N N   Is it safe for you to go home? Y Y Y Y       Learning Assessment:  No question data found.    Fall Risk:      2024     4:13 PM 3/22/2024     2:45 PM 2024    10:02 AM 2023     3:43 PM   Fall Risk   2 or more falls in past year? no no no no   Fall with injury in past year? no no no no           Coordination of Care:   1. \"Have you been to the ER,

## 2024-04-04 NOTE — PROGRESS NOTES
LM for patient to call back.  Ok for Kye Gallegos 1237 Thursday @4:40 per Payal Mcintyre Patient ID: Heber Gomes is a 68 y.o. female established patient presents for the following:      Subjective:     Heber Gomes presents for follow up on chronic conditions.  She is accompanied to visit by her daughter.  O2 saturation is 80% on room air, breathing is labored, patient verbalized shortness of breath.  She is alert and oriented x 4.  EMS was called to take patient to the ER.        No past medical history on file.    Past Surgical History:   Procedure Laterality Date    CATARACT EXTRACTION, BILATERAL      KIDNEY BIOPSY Left        Current Outpatient Medications   Medication Sig Dispense Refill    Dulaglutide 0.75 MG/0.5ML SOPN Inject 0.75 mg into the skin once a week 3 mL 1    hydrALAZINE (APRESOLINE) 100 MG tablet Take 1 tablet by mouth 3 times daily 90 tablet 1    furosemide (LASIX) 20 MG tablet Take 0.5 tablets by mouth daily 45 tablet 1    amLODIPine (NORVASC) 10 MG tablet Take 1 tablet by mouth daily 90 tablet 0    sertraline (ZOLOFT) 50 MG tablet take 1 tablet by mouth once daily for anxiety 90 tablet 0    metoprolol tartrate (LOPRESSOR) 25 MG tablet take 1 tablet by mouth twice a day for high blood pressure 180 tablet 1    sodium bicarbonate 650 MG tablet Take 1 tablet by mouth 3 times daily 270 tablet 1    atorvastatin (LIPITOR) 20 MG tablet Take 1 tablet by mouth daily 90 tablet 1    aspirin 81 MG chewable tablet Take 1 tablet by mouth daily TAKE 1 81MG MY MOUTH DAILY      vitamin D 25 MCG (1000 UT) CAPS Take by mouth TAKE 1 TABLET BY MOUTH ONCE A DAY      apixaban (ELIQUIS) 5 MG TABS tablet Take 1 tablet by mouth daily TAKE 1 5 MG TABLET BY MOUTH ONCE A DAY       No current facility-administered medications for this visit.          ROS   Review of Systems   Constitutional:  Negative for chills, fatigue and fever.   HENT:  Negative for ear pain, hearing loss, sore throat and trouble swallowing.    Eyes: Negative.    Respiratory:  Positive for shortness of breath. Negative for cough, chest

## 2024-04-17 ENCOUNTER — OFFICE VISIT (OUTPATIENT)
Facility: CLINIC | Age: 69
End: 2024-04-17

## 2024-04-17 VITALS
HEIGHT: 60 IN | HEART RATE: 82 BPM | SYSTOLIC BLOOD PRESSURE: 125 MMHG | TEMPERATURE: 97.5 F | WEIGHT: 123 LBS | RESPIRATION RATE: 16 BRPM | DIASTOLIC BLOOD PRESSURE: 51 MMHG | OXYGEN SATURATION: 93 % | BODY MASS INDEX: 24.15 KG/M2

## 2024-04-17 DIAGNOSIS — I10 PRIMARY HYPERTENSION: ICD-10-CM

## 2024-04-17 DIAGNOSIS — Z09 HOSPITAL DISCHARGE FOLLOW-UP: Primary | ICD-10-CM

## 2024-04-17 DIAGNOSIS — J18.9 PNEUMONIA OF LEFT LUNG DUE TO INFECTIOUS ORGANISM, UNSPECIFIED PART OF LUNG: ICD-10-CM

## 2024-04-17 DIAGNOSIS — Z95.0 PACEMAKER: ICD-10-CM

## 2024-04-17 RX ORDER — GUAIFENESIN 600 MG/1
600 TABLET, EXTENDED RELEASE ORAL 2 TIMES DAILY
Qty: 30 TABLET | Refills: 0 | Status: SHIPPED | OUTPATIENT
Start: 2024-04-17 | End: 2024-05-02

## 2024-04-17 RX ORDER — AMOXICILLIN 500 MG/1
500 CAPSULE ORAL 2 TIMES DAILY
Qty: 20 CAPSULE | Refills: 0 | Status: SHIPPED | OUTPATIENT
Start: 2024-04-17 | End: 2024-04-27

## 2024-04-17 RX ORDER — FUROSEMIDE 20 MG/1
20 TABLET ORAL DAILY
Qty: 90 TABLET | Refills: 1 | Status: SHIPPED | OUTPATIENT
Start: 2024-04-17 | End: 2024-07-16

## 2024-04-17 RX ORDER — AMLODIPINE BESYLATE 5 MG/1
5 TABLET ORAL DAILY
Qty: 90 TABLET | Refills: 1 | Status: SHIPPED | OUTPATIENT
Start: 2024-04-17

## 2024-04-17 SDOH — ECONOMIC STABILITY: FOOD INSECURITY: WITHIN THE PAST 12 MONTHS, YOU WORRIED THAT YOUR FOOD WOULD RUN OUT BEFORE YOU GOT MONEY TO BUY MORE.: NEVER TRUE

## 2024-04-17 SDOH — ECONOMIC STABILITY: INCOME INSECURITY: HOW HARD IS IT FOR YOU TO PAY FOR THE VERY BASICS LIKE FOOD, HOUSING, MEDICAL CARE, AND HEATING?: NOT HARD AT ALL

## 2024-04-17 SDOH — ECONOMIC STABILITY: FOOD INSECURITY: WITHIN THE PAST 12 MONTHS, THE FOOD YOU BOUGHT JUST DIDN'T LAST AND YOU DIDN'T HAVE MONEY TO GET MORE.: NEVER TRUE

## 2024-04-17 ASSESSMENT — PATIENT HEALTH QUESTIONNAIRE - PHQ9
SUM OF ALL RESPONSES TO PHQ QUESTIONS 1-9: 0
1. LITTLE INTEREST OR PLEASURE IN DOING THINGS: NOT AT ALL
2. FEELING DOWN, DEPRESSED OR HOPELESS: NOT AT ALL
SUM OF ALL RESPONSES TO PHQ9 QUESTIONS 1 & 2: 0
SUM OF ALL RESPONSES TO PHQ QUESTIONS 1-9: 0

## 2024-04-17 NOTE — PROGRESS NOTES
Post-Discharge Transitional Care Management Progress Note      Heber Gomes   YOB: 1955    Date of Office Visit:  4/17/2024  Date of Hospital Admission: 4/4/24  Date of Hospital Discharge: 4/8/24    Care management risk score Rising risk (score 2-5) and Complex Care (Scores >=6): No Risk Score On File     Non face to face  following discharge, date last encounter closed (first attempt may have been earlier): *No documented post hospital discharge outreach found in the last 14 days *No documented post hospital discharge outreach found in the last 14 days    Call initiated 2 business days of discharge: *No response recorded in the last 14 days    ASSESSMENT/PLAN:   Hospital discharge follow-up  -     NM DISCHARGE MEDS RECONCILED W/ CURRENT OUTPATIENT MED LIST    Medical Decision Making: moderate complexity  No follow-ups on file.    On this date 4/17/2024 I have spent 15 minutes reviewing previous notes, test results and face to face with the patient discussing the diagnosis and importance of compliance with the treatment plan as well as documenting on the day of the visit.     Subjective:   HPI:  Follow up of Hospital problems/diagnosis(es): Acute on chronic congestive heart failure    Inpatient course: Discharge summary reviewed- see chart.    Interval history/Current status: Stable    Patient Active Problem List   Diagnosis    Systemic lupus erythematosus (HCC)    ESRD (end stage renal disease) (HCC)    Chronic right-sided congestive heart failure (HCC)    Type 2 diabetes mellitus with chronic kidney disease, without long-term current use of insulin (HCC)    Pacemaker    Primary hypertension    Iron deficiency    Asymptomatic menopausal state    Paroxysmal A-fib (HCC)    Secondary hyperparathyroidism of renal origin (HCC)       Medications listed as ordered at the time of discharge from hospital     Medication List            Accurate as of April 17, 2024  9:15 AM. If you have any questions, ask

## 2024-04-17 NOTE — PROGRESS NOTES
Patient ID: Heber Gomes is a 68 y.o. female established patient presents for the following:      Subjective:     Heber Gomes presents for hospital follow up. She was admitted 4/4/24 and discharged 4/8/24 for dx of acute on chronic conjestive heart failure.   She has not been taking eliquis due to medication being discontinued in hospital.        No past medical history on file.    Past Surgical History:   Procedure Laterality Date    CATARACT EXTRACTION, BILATERAL      KIDNEY BIOPSY Left        Current Outpatient Medications   Medication Sig Dispense Refill    amLODIPine (NORVASC) 5 MG tablet Take 1 tablet by mouth daily 90 tablet 1    furosemide (LASIX) 20 MG tablet Take 1 tablet by mouth daily 90 tablet 1    guaiFENesin (MUCINEX) 600 MG extended release tablet Take 1 tablet by mouth 2 times daily for 15 days 30 tablet 0    Dulaglutide 0.75 MG/0.5ML SOPN Inject 0.75 mg into the skin once a week 3 mL 1    sertraline (ZOLOFT) 50 MG tablet take 1 tablet by mouth once daily for anxiety 90 tablet 0    metoprolol tartrate (LOPRESSOR) 25 MG tablet take 1 tablet by mouth twice a day for high blood pressure 180 tablet 1    sodium bicarbonate 650 MG tablet Take 1 tablet by mouth 3 times daily 270 tablet 1    atorvastatin (LIPITOR) 20 MG tablet Take 1 tablet by mouth daily 90 tablet 1    aspirin 81 MG chewable tablet Take 1 tablet by mouth daily TAKE 1 81MG MY MOUTH DAILY      vitamin D 25 MCG (1000 UT) CAPS Take by mouth TAKE 1 TABLET BY MOUTH ONCE A DAY      apixaban (ELIQUIS) 5 MG TABS tablet Take 1 tablet by mouth daily TAKE 1 5 MG TABLET BY MOUTH ONCE A DAY       No current facility-administered medications for this visit.          ROS   Review of Systems   Constitutional:  Negative for chills, fatigue and fever.   HENT:  Negative for ear pain, hearing loss, sore throat and trouble swallowing.    Eyes: Negative.    Respiratory:  Negative for cough, chest tightness, shortness of breath and wheezing.    Cardiovascular:

## 2024-04-17 NOTE — PROGRESS NOTES
Heber Gomes is a 68 y.o. year old female who presents in office today for   Chief Complaint   Patient presents with    Follow-Up from Hospital       Is someone accompanying this pt? YES, SON AND     Is the patient using any DME equipment during OV? NO    Depression Screenin/17/2024     9:02 AM 2024     4:18 PM 3/22/2024     2:45 PM 2024    10:02 AM 2023     3:53 PM   PHQ-9 Questionaire   Little interest or pleasure in doing things 0 0 0 0 0   Feeling down, depressed, or hopeless 0 0 0 0 0   Trouble falling or staying asleep, or sleeping too much     0   Feeling tired or having little energy     0   Poor appetite or overeating     0   Feeling bad about yourself - or that you are a failure or have let yourself or your family down     0   Trouble concentrating on things, such as reading the newspaper or watching television     0   Moving or speaking so slowly that other people could have noticed. Or the opposite - being so fidgety or restless that you have been moving around a lot more than usual     0   Thoughts that you would be better off dead, or of hurting yourself in some way     0   PHQ-9 Total Score 0 0 0 0 0   If you checked off any problems, how difficult have these problems made it for you to do your work, take care of things at home, or get along with other people?     0       Abuse Screenin/4/2024     4:00 PM 3/22/2024     2:00 PM 2024    10:00 AM 2023     3:00 PM   AMB Abuse Screening   Do you ever feel afraid of your partner? N N N N   Are you in a relationship with someone who physically or mentally threatens you? N N N N   Is it safe for you to go home? Y Y Y Y       Learning Assessment:  No question data found.    Fall Risk:      2024     9:01 AM 2024     4:13 PM 3/22/2024     2:45 PM 2024    10:02 AM 2023     3:43 PM   Fall Risk   2 or more falls in past year? no no no no no   Fall with injury in past year? no no no no no

## 2024-04-17 NOTE — PATIENT INSTRUCTIONS
Ask cardiologist about blood thinner as she has not been taking anything and has pacemaker and A.fib and HF.    Ask Nephrologist AND cardiologist to speak to each other to prescribe does of Furosemide            [facility]ly [facility] Transportation Resources*  (Call 211 if need more resources.)      Senior [facility]  What they offer: [facility] [employer] offers fixed routes, medical transportation, and on-demand response transit.   Accepts donations  Fare: $1.00 each way  Phone Number: [phone]  Website: [email or web address]    [facility] [employer]it  What they offer: In compliance with the American Disabilities Act, [facility] [employer] provides a shared ride, advanced reservation, origin to destination service for disabled individuals who are unable to use regular fixed route public transportation services because of their disabilities.   Phone Number: [phone]  Apply online: https://[email or web address].Metaplace/[email or web address].aspx or [email or web address]    Medicare Advantage Plans  Some plans may provide transportation. Members should contact the Member Services or Transportation number on the back of their insurance card for more information or to schedule transportation.    Medicaid Plans - [facility] Plus     Each health plan has options for transportation services. Contact your insurance provider to schedule transportation. Transportation or Member Services contact information is listed on the back of the insurance card.       Insurance Contacts     o [facility]   Phone: [phone]   Website:  [email or web address]    o [facility] [employer] [facility]   Phone: [phone]   Website: [email or web address]    o [facility]   Phone: [phone]   Website: [email or web address]    o [facility]  Phone: [phone] or

## 2024-05-06 ENCOUNTER — TELEPHONE (OUTPATIENT)
Facility: CLINIC | Age: 69
End: 2024-05-06

## 2024-05-06 NOTE — TELEPHONE ENCOUNTER
Pt's daughter, Lorrie called stating her mom will not be able to make it in to the office for her appt as she is unable to walk. She says he mom is super wak and would like to speak with SINA Ramirez about her mothers care. Ms. Andrew did state she wanted referrals sent to specialist for her mom, along with in home care.    Please follow up via phone when available.    Thank you!

## 2024-05-07 ENCOUNTER — TELEPHONE (OUTPATIENT)
Facility: CLINIC | Age: 69
End: 2024-05-07

## 2024-05-07 NOTE — TELEPHONE ENCOUNTER
Dick with [facility]  called with an update regarding pt:    D/c from [facility] on 5/5/24  Dx:  Diabetes/pneumonia  Had surgery on 4/29/24  BP:  140/60  Oxygen saturate:  84%, but after breathing exercise it went up to 89%  Pt will be going to dialysis    Please call, at your earliest convenience, to discuss.  Ph:  [phone]     Thank you.

## 2024-05-08 ENCOUNTER — TELEMEDICINE (OUTPATIENT)
Facility: CLINIC | Age: 69
End: 2024-05-08
Payer: COMMERCIAL

## 2024-05-08 DIAGNOSIS — R06.02 SHORTNESS OF BREATH: Primary | ICD-10-CM

## 2024-05-08 DIAGNOSIS — Z87.81 S/P LEFT HIP FRACTURE: ICD-10-CM

## 2024-05-08 PROCEDURE — 1123F ACP DISCUSS/DSCN MKR DOCD: CPT

## 2024-05-08 PROCEDURE — 99214 OFFICE O/P EST MOD 30 MIN: CPT

## 2024-05-23 ENCOUNTER — OFFICE VISIT (OUTPATIENT)
Facility: CLINIC | Age: 69
End: 2024-05-23
Payer: COMMERCIAL

## 2024-05-23 VITALS
SYSTOLIC BLOOD PRESSURE: 132 MMHG | HEART RATE: 63 BPM | TEMPERATURE: 98.2 F | BODY MASS INDEX: 23.16 KG/M2 | DIASTOLIC BLOOD PRESSURE: 60 MMHG | WEIGHT: 118 LBS | RESPIRATION RATE: 16 BRPM | HEIGHT: 60 IN | OXYGEN SATURATION: 97 %

## 2024-05-23 DIAGNOSIS — E11.22 TYPE 2 DIABETES MELLITUS WITH CHRONIC KIDNEY DISEASE, WITHOUT LONG-TERM CURRENT USE OF INSULIN, UNSPECIFIED CKD STAGE (HCC): ICD-10-CM

## 2024-05-23 DIAGNOSIS — N18.6 ESRD (END STAGE RENAL DISEASE) (HCC): ICD-10-CM

## 2024-05-23 DIAGNOSIS — I50.9 CHRONIC CONGESTIVE HEART FAILURE, UNSPECIFIED HEART FAILURE TYPE (HCC): ICD-10-CM

## 2024-05-23 PROCEDURE — 3078F DIAST BP <80 MM HG: CPT

## 2024-05-23 PROCEDURE — 3044F HG A1C LEVEL LT 7.0%: CPT

## 2024-05-23 PROCEDURE — 99214 OFFICE O/P EST MOD 30 MIN: CPT

## 2024-05-23 PROCEDURE — 3075F SYST BP GE 130 - 139MM HG: CPT

## 2024-05-23 PROCEDURE — 1123F ACP DISCUSS/DSCN MKR DOCD: CPT

## 2024-05-23 RX ORDER — FERROUS SULFATE 325(65) MG
TABLET ORAL
COMMUNITY
Start: 2024-05-03

## 2024-05-23 RX ORDER — ATORVASTATIN CALCIUM 20 MG/1
20 TABLET, FILM COATED ORAL DAILY
Qty: 90 TABLET | Refills: 1 | Status: SHIPPED | OUTPATIENT
Start: 2024-05-23

## 2024-05-23 RX ORDER — APIXABAN 2.5 MG/1
TABLET, FILM COATED ORAL
COMMUNITY
Start: 2024-05-01

## 2024-05-23 RX ORDER — OXYCODONE HYDROCHLORIDE 5 MG/1
5-10 TABLET ORAL EVERY 6 HOURS PRN
COMMUNITY
Start: 2024-04-24 | End: 2024-05-23

## 2024-05-23 RX ORDER — ACETAMINOPHEN 500 MG
1000 TABLET ORAL EVERY 8 HOURS
COMMUNITY
Start: 2024-05-15 | End: 2024-05-25

## 2024-05-23 RX ORDER — AMOXICILLIN 250 MG
1 CAPSULE ORAL 2 TIMES DAILY
COMMUNITY
Start: 2024-05-01

## 2024-05-23 RX ORDER — FUROSEMIDE 40 MG/1
40 TABLET ORAL EVERY EVENING
COMMUNITY
Start: 2024-05-02

## 2024-05-23 SDOH — ECONOMIC STABILITY: FOOD INSECURITY: WITHIN THE PAST 12 MONTHS, YOU WORRIED THAT YOUR FOOD WOULD RUN OUT BEFORE YOU GOT MONEY TO BUY MORE.: NEVER TRUE

## 2024-05-23 SDOH — ECONOMIC STABILITY: FOOD INSECURITY: WITHIN THE PAST 12 MONTHS, THE FOOD YOU BOUGHT JUST DIDN'T LAST AND YOU DIDN'T HAVE MONEY TO GET MORE.: NEVER TRUE

## 2024-05-23 SDOH — ECONOMIC STABILITY: INCOME INSECURITY: HOW HARD IS IT FOR YOU TO PAY FOR THE VERY BASICS LIKE FOOD, HOUSING, MEDICAL CARE, AND HEATING?: NOT HARD AT ALL

## 2024-05-23 ASSESSMENT — PATIENT HEALTH QUESTIONNAIRE - PHQ9
1. LITTLE INTEREST OR PLEASURE IN DOING THINGS: NOT AT ALL
SUM OF ALL RESPONSES TO PHQ9 QUESTIONS 1 & 2: 0
SUM OF ALL RESPONSES TO PHQ QUESTIONS 1-9: 0
2. FEELING DOWN, DEPRESSED OR HOPELESS: NOT AT ALL
SUM OF ALL RESPONSES TO PHQ QUESTIONS 1-9: 0

## 2024-05-23 NOTE — PROGRESS NOTES
Heber Gomes is a 68 y.o. year old female who presents in office today for   Chief Complaint   Patient presents with    Follow-Up from Hospital       Is someone accompanying this pt? Yes son    Is the patient using any DME equipment during OV? Yes wheelchair    Depression Screenin/23/2024     3:42 PM 2024     9:02 AM 2024     4:18 PM 3/22/2024     2:45 PM 2024    10:02 AM 2023     3:53 PM   PHQ-9 Questionaire   Little interest or pleasure in doing things 0 0 0 0 0 0   Feeling down, depressed, or hopeless 0 0 0 0 0 0   Trouble falling or staying asleep, or sleeping too much      0   Feeling tired or having little energy      0   Poor appetite or overeating      0   Feeling bad about yourself - or that you are a failure or have let yourself or your family down      0   Trouble concentrating on things, such as reading the newspaper or watching television      0   Moving or speaking so slowly that other people could have noticed. Or the opposite - being so fidgety or restless that you have been moving around a lot more than usual      0   Thoughts that you would be better off dead, or of hurting yourself in some way      0   PHQ-9 Total Score 0 0 0 0 0 0   If you checked off any problems, how difficult have these problems made it for you to do your work, take care of things at home, or get along with other people?      0       Abuse Screenin/23/2024     3:00 PM 2024     4:00 PM 3/22/2024     2:00 PM 2024    10:00 AM 2023     3:00 PM   AMB Abuse Screening   Do you ever feel afraid of your partner? N N N N N   Are you in a relationship with someone who physically or mentally threatens you? N N N N N   Is it safe for you to go home? Y Y Y Y Y       Learning Assessment:  No question data found.    Fall Risk:      2024     3:43 PM 2024     9:01 AM 2024     4:13 PM 3/22/2024     2:45 PM 2024    10:02 AM 2023     3:43 PM   Fall Risk   2 or more falls

## 2024-05-27 ASSESSMENT — ENCOUNTER SYMPTOMS
EYES NEGATIVE: 1
TROUBLE SWALLOWING: 0
NAUSEA: 0
BLOOD IN STOOL: 0
CONSTIPATION: 0
DIARRHEA: 0
ABDOMINAL PAIN: 0
SORE THROAT: 0
VOMITING: 0
COUGH: 0
CHEST TIGHTNESS: 0
SHORTNESS OF BREATH: 1
WHEEZING: 0

## 2024-05-27 NOTE — PROGRESS NOTES
Heber Gomes (: 1955) is a 68 y.o. female, established  patient, here for evaluation of the following:      Subjective:     Patient is present with her daughter. She reports mild dyspnea.  She is requesting order for oxygen and pain.  Patient denies requiring supplemental oxygen in the past.  She denies any chest pain, no leg swelling.  Denies history of asthma or COPD.  Significant past medical history of chronic right-sided heart failure, A-fib, end-stage renal disease and diabetes.    No past medical history on file.     Past Surgical History:   Procedure Laterality Date    CATARACT EXTRACTION, BILATERAL      KIDNEY BIOPSY Left         Current Outpatient Medications   Medication Sig Dispense Refill    ferrous sulfate (IRON 325) 325 (65 Fe) MG tablet       senna-docusate (PERICOLACE) 8.6-50 MG per tablet Take 1 tablet by mouth 2 times daily      furosemide (LASIX) 40 MG tablet Take 1 tablet by mouth every evening      ELIQUIS 2.5 MG TABS tablet PLEASE SEE ATTACHED FOR DETAILED DIRECTIONS      dulaglutide (TRULICITY) 0.75 MG/0.5ML SOPN SC injection Inject 0.5 mLs into the skin once a week 3 mL 3    atorvastatin (LIPITOR) 20 MG tablet Take 1 tablet by mouth daily 90 tablet 1    amLODIPine (NORVASC) 5 MG tablet Take 1 tablet by mouth daily 90 tablet 1    sertraline (ZOLOFT) 50 MG tablet take 1 tablet by mouth once daily for anxiety 90 tablet 0    metoprolol tartrate (LOPRESSOR) 25 MG tablet take 1 tablet by mouth twice a day for high blood pressure 180 tablet 1    sodium bicarbonate 650 MG tablet Take 1 tablet by mouth 3 times daily 270 tablet 1    aspirin 81 MG chewable tablet Take 1 tablet by mouth daily TAKE 1 81MG MY MOUTH DAILY      vitamin D 25 MCG (1000 UT) CAPS Take by mouth TAKE 1 TABLET BY MOUTH ONCE A DAY       No current facility-administered medications for this visit.       ROS:    Review of Systems   Constitutional:  Negative for chills, fatigue and fever.   HENT:  Negative for ear pain,

## 2024-06-04 ENCOUNTER — TELEPHONE (OUTPATIENT)
Facility: CLINIC | Age: 69
End: 2024-06-04

## 2024-06-04 NOTE — TELEPHONE ENCOUNTER
Home health care  ([facility]),is reporting  Patient has gain 7.5 lbs since 5/30/2024    All vitals are all good    RN will be coming for a visit tomorrow      Please advise    Thank you

## 2024-06-20 DIAGNOSIS — I50.9 CHRONIC CONGESTIVE HEART FAILURE, UNSPECIFIED HEART FAILURE TYPE (HCC): ICD-10-CM

## 2024-06-20 DIAGNOSIS — E11.22 TYPE 2 DIABETES MELLITUS WITH CHRONIC KIDNEY DISEASE, WITHOUT LONG-TERM CURRENT USE OF INSULIN, UNSPECIFIED CKD STAGE (HCC): ICD-10-CM

## 2024-06-20 DIAGNOSIS — N18.6 ESRD (END STAGE RENAL DISEASE) (HCC): ICD-10-CM

## 2024-06-20 NOTE — TELEPHONE ENCOUNTER
Medication(s) requesting:   Requested Prescriptions     Pending Prescriptions Disp Refills    sertraline (ZOLOFT) 50 MG tablet [Pharmacy Med Name: SERTRALINE HCL 50 MG TABLET] 90 tablet 1     Sig: TAKE 1 TABLET BY MOUTH ONCE DAILY FOR ANXIETY    apixaban (ELIQUIS) 2.5 MG TABS tablet 90 tablet 1     Sig: Take 1 tablet by mouth daily    aspirin 81 MG chewable tablet 90 tablet 1     Sig: Take 1 tablet by mouth daily TAKE 1 81MG MY MOUTH DAILY       Last office visit:  5/23/24  Next office visit DMA: 6/20/2024  FUTURE APPT:   Future Appointments   Date Time Provider Department Center   7/17/2024  3:15 PM Keren Ramirez, NPTishaC [facility] BS [facility]         Lab Results   Component Value Date     03/22/2024    K 4.3 03/22/2024     03/22/2024    CO2 27 03/22/2024    BUN 69 (H) 03/22/2024    CREATININE 3.77 (H) 03/22/2024    GLUCOSE 195 (H) 03/22/2024    CALCIUM 9.1 03/22/2024    BILITOT 0.4 03/22/2024    ALKPHOS 269 (H) 03/22/2024    AST 40 (H) 03/22/2024    ALT 31 03/22/2024    LABGLOM 12 (L) 03/22/2024    GLOB 4.7 (H) 03/22/2024

## 2024-06-20 NOTE — TELEPHONE ENCOUNTER
Medication(s) requesting:   Requested Prescriptions     Pending Prescriptions Disp Refills    dulaglutide (TRULICITY) 0.75 MG/0.5ML SOPN SC injection 3 mL 3     Sig: Inject 0.5 mLs into the skin once a week    sertraline (ZOLOFT) 50 MG tablet 90 tablet 1     Sig: take 1 tablet by mouth once daily for anxiety       Last office visit:  5/23/24  Next office visit DMA: 7/17/2024  FUTURE APPT:   Future Appointments   Date Time Provider Department Center   7/17/2024  3:15 PM Keren Ramirez, NPTishaC [facility] BS AM[facility]         Lab Results   Component Value Date     03/22/2024    K 4.3 03/22/2024     03/22/2024    CO2 27 03/22/2024    BUN 69 (H) 03/22/2024    CREATININE 3.77 (H) 03/22/2024    GLUCOSE 195 (H) 03/22/2024    CALCIUM 9.1 03/22/2024    BILITOT 0.4 03/22/2024    ALKPHOS 269 (H) 03/22/2024    AST 40 (H) 03/22/2024    ALT 31 03/22/2024    LABGLOM 12 (L) 03/22/2024    GLOB 4.7 (H) 03/22/2024

## 2024-06-20 NOTE — TELEPHONE ENCOUNTER
Patient is requesting a refill on the following mediacation:    Home Care nurse is requesting for provider to reply to message concerning her medication.      Please advise    Thank you

## 2024-06-23 RX ORDER — ASPIRIN 81 MG/1
81 TABLET, CHEWABLE ORAL DAILY
Qty: 90 TABLET | Refills: 1 | Status: SHIPPED | OUTPATIENT
Start: 2024-06-23

## 2024-07-24 ENCOUNTER — OFFICE VISIT (OUTPATIENT)
Facility: CLINIC | Age: 69
End: 2024-07-24
Payer: COMMERCIAL

## 2024-07-24 VITALS
HEIGHT: 60 IN | TEMPERATURE: 98 F | WEIGHT: 123.8 LBS | BODY MASS INDEX: 24.3 KG/M2 | OXYGEN SATURATION: 97 % | SYSTOLIC BLOOD PRESSURE: 160 MMHG | DIASTOLIC BLOOD PRESSURE: 68 MMHG | RESPIRATION RATE: 20 BRPM | HEART RATE: 76 BPM

## 2024-07-24 DIAGNOSIS — E55.9 VITAMIN D DEFICIENCY: ICD-10-CM

## 2024-07-24 DIAGNOSIS — Z01.84 IMMUNITY STATUS TESTING: ICD-10-CM

## 2024-07-24 DIAGNOSIS — E11.22 TYPE 2 DIABETES MELLITUS WITH CHRONIC KIDNEY DISEASE, WITHOUT LONG-TERM CURRENT USE OF INSULIN, UNSPECIFIED CKD STAGE (HCC): ICD-10-CM

## 2024-07-24 DIAGNOSIS — N18.6 ESRD (END STAGE RENAL DISEASE) (HCC): ICD-10-CM

## 2024-07-24 DIAGNOSIS — I50.9 CHRONIC CONGESTIVE HEART FAILURE, UNSPECIFIED HEART FAILURE TYPE (HCC): ICD-10-CM

## 2024-07-24 DIAGNOSIS — Z12.11 SCREENING FOR COLON CANCER: Primary | ICD-10-CM

## 2024-07-24 PROCEDURE — 3044F HG A1C LEVEL LT 7.0%: CPT

## 2024-07-24 PROCEDURE — 3077F SYST BP >= 140 MM HG: CPT

## 2024-07-24 PROCEDURE — 1123F ACP DISCUSS/DSCN MKR DOCD: CPT

## 2024-07-24 PROCEDURE — 99214 OFFICE O/P EST MOD 30 MIN: CPT

## 2024-07-24 PROCEDURE — 3078F DIAST BP <80 MM HG: CPT

## 2024-07-24 ASSESSMENT — PATIENT HEALTH QUESTIONNAIRE - PHQ9
SUM OF ALL RESPONSES TO PHQ9 QUESTIONS 1 & 2: 0
SUM OF ALL RESPONSES TO PHQ QUESTIONS 1-9: 0
2. FEELING DOWN, DEPRESSED OR HOPELESS: NOT AT ALL
1. LITTLE INTEREST OR PLEASURE IN DOING THINGS: NOT AT ALL
SUM OF ALL RESPONSES TO PHQ QUESTIONS 1-9: 0

## 2024-07-24 NOTE — PATIENT INSTRUCTIONS
Dr. Julius Sandoval  Address: [address] [place] [address] [address], [place] [address]  Phone: [phone]    Fasting labs: 6 hours fasting prior to blood draw. You can have fruit, juice, black tea, black coffee, no dairy, no non-dairy creamer, no other foods.

## 2024-07-24 NOTE — PROGRESS NOTES
Heber Gomes is a 68 y.o. presents today for   Chief Complaint   Patient presents with    3 Month Follow-Up       Is someone accompanying this pt? YES/      Is the patient using any DME equipment during OV? YES/ WALKER     Depression Screenin/23/2024     3:42 PM 2024     9:02 AM 2024     4:18 PM 3/22/2024     2:45 PM 2024    10:02 AM 2023     3:53 PM   PHQ-9 Questionaire   Little interest or pleasure in doing things 0 0 0 0 0 0   Feeling down, depressed, or hopeless 0 0 0 0 0 0   Trouble falling or staying asleep, or sleeping too much      0   Feeling tired or having little energy      0   Poor appetite or overeating      0   Feeling bad about yourself - or that you are a failure or have let yourself or your family down      0   Trouble concentrating on things, such as reading the newspaper or watching television      0   Moving or speaking so slowly that other people could have noticed. Or the opposite - being so fidgety or restless that you have been moving around a lot more than usual      0   Thoughts that you would be better off dead, or of hurting yourself in some way      0   PHQ-9 Total Score 0 0 0 0 0 0   If you checked off any problems, how difficult have these problems made it for you to do your work, take care of things at home, or get along with other people?      0       Abuse Screenin/23/2024     3:00 PM 2024     4:00 PM 3/22/2024     2:00 PM 2024    10:00 AM 2023     3:00 PM   AMB Abuse Screening   Do you ever feel afraid of your partner? N N N N N   Are you in a relationship with someone who physically or mentally threatens you? N N N N N   Is it safe for you to go home? Y Y Y Y Y       Learning Assessment:  No question data found.    Fall Risk:      2024     3:43 PM 2024     9:01 AM 2024     4:13 PM 3/22/2024     2:45 PM 2024    10:02 AM 2023     3:43 PM   Fall Risk   Do you feel unsteady or are you worried about

## 2024-07-29 ASSESSMENT — ENCOUNTER SYMPTOMS
CONSTIPATION: 0
DIARRHEA: 0
CHEST TIGHTNESS: 0
WHEEZING: 0
EYES NEGATIVE: 1
SORE THROAT: 0
BLOOD IN STOOL: 0
VOMITING: 0
TROUBLE SWALLOWING: 0
NAUSEA: 0
ABDOMINAL PAIN: 0
SHORTNESS OF BREATH: 0
COUGH: 0

## 2024-07-29 NOTE — PROGRESS NOTES
Patient ID: Heber Gomes is a 68 y.o. female established patient presents for the following:      Subjective:     Primary historian: patient and spouse    3 Month Follow-Up       She presents for follow-up of chronic conditions.  She is taking high-dose Lasix per cardiology.  She was asked to follow-up with cardiologist and nephrologist to reevaluate dosing as medication dose was from hospital discharge.           No past medical history on file.    Past Surgical History:   Procedure Laterality Date    CATARACT EXTRACTION, BILATERAL      KIDNEY BIOPSY Left        Current Outpatient Medications   Medication Sig Dispense Refill    sertraline (ZOLOFT) 50 MG tablet TAKE 1 TABLET BY MOUTH ONCE DAILY FOR ANXIETY 90 tablet 1    apixaban (ELIQUIS) 2.5 MG TABS tablet Take 1 tablet by mouth daily 90 tablet 1    aspirin 81 MG chewable tablet Take 1 tablet by mouth daily TAKE 1 81MG MY MOUTH DAILY 90 tablet 1    dulaglutide (TRULICITY) 0.75 MG/0.5ML SOPN SC injection Inject 0.5 mLs into the skin once a week 3 mL 3    ferrous sulfate (IRON 325) 325 (65 Fe) MG tablet       senna-docusate (PERICOLACE) 8.6-50 MG per tablet Take 1 tablet by mouth 2 times daily      furosemide (LASIX) 40 MG tablet Take 1 tablet by mouth every evening      atorvastatin (LIPITOR) 20 MG tablet Take 1 tablet by mouth daily 90 tablet 1    amLODIPine (NORVASC) 5 MG tablet Take 1 tablet by mouth daily 90 tablet 1    metoprolol tartrate (LOPRESSOR) 25 MG tablet take 1 tablet by mouth twice a day for high blood pressure 180 tablet 1    sodium bicarbonate 650 MG tablet Take 1 tablet by mouth 3 times daily 270 tablet 1    vitamin D 25 MCG (1000 UT) CAPS Take by mouth TAKE 1 TABLET BY MOUTH ONCE A DAY       No current facility-administered medications for this visit.          ROS   Review of Systems   Constitutional:  Negative for chills, fatigue and fever.   HENT:  Negative for ear pain, hearing loss, sore throat and trouble swallowing.    Eyes: Negative.

## 2024-08-30 NOTE — TELEPHONE ENCOUNTER
Medication(s) requesting:   Requested Prescriptions     Pending Prescriptions Disp Refills    metoprolol tartrate (LOPRESSOR) 25 MG tablet [Pharmacy Med Name: METOPROLOL TARTRATE 25 MG TAB] 180 tablet 1     Sig: TAKE 1 TABLET BY MOUTH TWICE A DAY FOR HIGH BLOOD PRESSURE       Last office visit:  7.24.24  Next office visit DMA: 9/5/2024

## 2024-09-02 RX ORDER — METOPROLOL TARTRATE 25 MG/1
TABLET, FILM COATED ORAL
Qty: 180 TABLET | Refills: 1 | Status: SHIPPED | OUTPATIENT
Start: 2024-09-02

## 2024-09-03 LAB
APPEARANCE UR: CLEAR
BACTERIA #/AREA URNS HPF: NORMAL /[HPF]
BILIRUB UR QL STRIP: NEGATIVE
CASTS URNS QL MICRO: NORMAL /LPF
COLOR UR: YELLOW
EPI CELLS #/AREA URNS HPF: NORMAL /HPF (ref 0–10)
GLUCOSE UR QL STRIP: NEGATIVE
HGB UR QL STRIP: NEGATIVE
KETONES UR QL STRIP: NEGATIVE
LEUKOCYTE ESTERASE UR QL STRIP: NEGATIVE
MICRO URNS: ABNORMAL
NITRITE UR QL STRIP: NEGATIVE
PH UR STRIP: 6.5 [PH] (ref 5–7.5)
PROT UR QL STRIP: ABNORMAL
RBC #/AREA URNS HPF: NORMAL /HPF (ref 0–2)
SP GR UR STRIP: 1.01 (ref 1–1.03)
SPECIMEN STATUS REPORT: NORMAL
UROBILINOGEN UR STRIP-MCNC: 0.2 MG/DL (ref 0.2–1)
WBC #/AREA URNS HPF: NORMAL /HPF (ref 0–5)

## 2024-09-04 LAB
25(OH)D3+25(OH)D2 SERPL-MCNC: 34.2 NG/ML (ref 30–100)
ALBUMIN SERPL-MCNC: 4.6 G/DL (ref 3.9–4.9)
ALBUMIN/CREAT UR: 643 MG/G CREAT (ref 0–29)
ALP SERPL-CCNC: 276 IU/L (ref 44–121)
ALT SERPL-CCNC: 14 IU/L (ref 0–32)
AST SERPL-CCNC: 29 IU/L (ref 0–40)
BASOPHILS # BLD AUTO: 0.1 X10E3/UL (ref 0–0.2)
BASOPHILS NFR BLD AUTO: 1 %
BILIRUB SERPL-MCNC: 0.4 MG/DL (ref 0–1.2)
BUN SERPL-MCNC: 60 MG/DL (ref 8–27)
BUN/CREAT SERPL: 21 (ref 12–28)
CALCIUM SERPL-MCNC: 9.7 MG/DL (ref 8.7–10.3)
CHLORIDE SERPL-SCNC: 101 MMOL/L (ref 96–106)
CHOLEST SERPL-MCNC: 117 MG/DL (ref 100–199)
CO2 SERPL-SCNC: 24 MMOL/L (ref 20–29)
CREAT SERPL-MCNC: 2.91 MG/DL (ref 0.57–1)
CREAT UR-MCNC: 41 MG/DL
EGFRCR SERPLBLD CKD-EPI 2021: 17 ML/MIN/1.73
EOSINOPHIL # BLD AUTO: 0.4 X10E3/UL (ref 0–0.4)
EOSINOPHIL NFR BLD AUTO: 5 %
ERYTHROCYTE [DISTWIDTH] IN BLOOD BY AUTOMATED COUNT: 12.2 % (ref 11.7–15.4)
GLOBULIN SER CALC-MCNC: 4 G/DL (ref 1.5–4.5)
GLUCOSE SERPL-MCNC: 93 MG/DL (ref 70–99)
HBA1C MFR BLD: 6.2 % (ref 4.8–5.6)
HCT VFR BLD AUTO: 34.1 % (ref 34–46.6)
HCV IGG SERPL QL IA: NON REACTIVE
HDLC SERPL-MCNC: 48 MG/DL
HGB BLD-MCNC: 11.3 G/DL (ref 11.1–15.9)
IMM GRANULOCYTES # BLD AUTO: 0 X10E3/UL (ref 0–0.1)
IMM GRANULOCYTES NFR BLD AUTO: 0 %
LDLC SERPL CALC-MCNC: 48 MG/DL (ref 0–99)
LYMPHOCYTES # BLD AUTO: 1.7 X10E3/UL (ref 0.7–3.1)
LYMPHOCYTES NFR BLD AUTO: 23 %
MCH RBC QN AUTO: 31 PG (ref 26.6–33)
MCHC RBC AUTO-ENTMCNC: 33.1 G/DL (ref 31.5–35.7)
MCV RBC AUTO: 94 FL (ref 79–97)
MICROALBUMIN UR-MCNC: 263.5 UG/ML
MONOCYTES # BLD AUTO: 0.9 X10E3/UL (ref 0.1–0.9)
MONOCYTES NFR BLD AUTO: 12 %
NEUTROPHILS # BLD AUTO: 4.4 X10E3/UL (ref 1.4–7)
NEUTROPHILS NFR BLD AUTO: 59 %
PLATELET # BLD AUTO: 179 X10E3/UL (ref 150–450)
POTASSIUM SERPL-SCNC: 4.6 MMOL/L (ref 3.5–5.2)
PROT SERPL-MCNC: 8.6 G/DL (ref 6–8.5)
RBC # BLD AUTO: 3.64 X10E6/UL (ref 3.77–5.28)
SODIUM SERPL-SCNC: 142 MMOL/L (ref 134–144)
TRIGL SERPL-MCNC: 113 MG/DL (ref 0–149)
VLDLC SERPL CALC-MCNC: 21 MG/DL (ref 5–40)
WBC # BLD AUTO: 7.5 X10E3/UL (ref 3.4–10.8)

## 2024-09-06 ENCOUNTER — OFFICE VISIT (OUTPATIENT)
Facility: CLINIC | Age: 69
End: 2024-09-06
Payer: COMMERCIAL

## 2024-09-06 VITALS
OXYGEN SATURATION: 95 % | HEART RATE: 80 BPM | HEIGHT: 60 IN | RESPIRATION RATE: 16 BRPM | TEMPERATURE: 97.7 F | WEIGHT: 122 LBS | DIASTOLIC BLOOD PRESSURE: 80 MMHG | BODY MASS INDEX: 23.95 KG/M2 | SYSTOLIC BLOOD PRESSURE: 149 MMHG

## 2024-09-06 DIAGNOSIS — I10 PRIMARY HYPERTENSION: ICD-10-CM

## 2024-09-06 PROCEDURE — 3077F SYST BP >= 140 MM HG: CPT

## 2024-09-06 PROCEDURE — 99213 OFFICE O/P EST LOW 20 MIN: CPT

## 2024-09-06 PROCEDURE — 3079F DIAST BP 80-89 MM HG: CPT

## 2024-09-06 PROCEDURE — 1123F ACP DISCUSS/DSCN MKR DOCD: CPT

## 2024-09-06 RX ORDER — AMLODIPINE BESYLATE 10 MG/1
10 TABLET ORAL DAILY
Qty: 90 TABLET | Refills: 1 | Status: SHIPPED | OUTPATIENT
Start: 2024-09-06

## 2024-09-10 RX ORDER — FUROSEMIDE 40 MG
40 TABLET ORAL 2 TIMES DAILY
Qty: 180 TABLET | Refills: 1 | Status: SHIPPED | OUTPATIENT
Start: 2024-09-10

## 2024-09-19 ASSESSMENT — ENCOUNTER SYMPTOMS
WHEEZING: 0
VOMITING: 0
CHEST TIGHTNESS: 0
BLOOD IN STOOL: 0
CONSTIPATION: 0
ABDOMINAL PAIN: 0
NAUSEA: 0
SORE THROAT: 0
DIARRHEA: 0
TROUBLE SWALLOWING: 0
EYES NEGATIVE: 1
SHORTNESS OF BREATH: 0
COUGH: 0

## 2024-09-20 ENCOUNTER — TELEPHONE (OUTPATIENT)
Facility: CLINIC | Age: 69
End: 2024-09-20

## 2024-09-20 NOTE — TELEPHONE ENCOUNTER
Deshawn from [facility] called in reference for Homecare order. Please be advised, thank you.

## 2024-09-25 ENCOUNTER — TELEPHONE (OUTPATIENT)
Facility: CLINIC | Age: 69
End: 2024-09-25

## 2024-10-03 NOTE — TELEPHONE ENCOUNTER
Medication(s) requesting:   Requested Prescriptions     Pending Prescriptions Disp Refills    sodium bicarbonate 650 MG tablet [Pharmacy Med Name: SODIUM BICARB 650 MG TABLET] 90 tablet 5     Sig: TAKE 1 TABLET BY MOUTH THREE TIMES A DAY       Last office visit:  9.6.24  Next office visit DMA: Visit date not found

## 2024-10-04 RX ORDER — SODIUM BICARBONATE 650 MG/1
650 TABLET ORAL 3 TIMES DAILY
Qty: 90 TABLET | Refills: 5 | Status: SHIPPED | OUTPATIENT
Start: 2024-10-04 | End: 2024-10-04 | Stop reason: ALTCHOICE

## 2024-10-14 ENCOUNTER — TELEPHONE (OUTPATIENT)
Facility: CLINIC | Age: 69
End: 2024-10-14

## 2024-10-14 NOTE — TELEPHONE ENCOUNTER
Pt called stating pharmacy advised her she needs a New RX for Sodium bicarbonate 650 mg tab.    Pt states she has been out of this medication and would like to know if it can be refilled ASAP?    LOV:  9/6/24  NOV:  No appt scheduled    Please advise and assist. Thank you.

## 2024-12-11 DIAGNOSIS — I10 PRIMARY HYPERTENSION: ICD-10-CM

## 2024-12-11 NOTE — TELEPHONE ENCOUNTER
Medication(s) requesting:   Requested Prescriptions     Pending Prescriptions Disp Refills    sertraline (ZOLOFT) 50 MG tablet [Pharmacy Med Name: SERTRALINE HCL 50 MG TABLET] 90 tablet 1     Sig: TAKE 1 TABLET BY MOUTH ONCE DAILY FOR ANXIETY       Last office visit:  9.6.24  Next office visit DMA: 12/19/2024

## 2024-12-12 RX ORDER — AMLODIPINE BESYLATE 5 MG/1
5 TABLET ORAL DAILY
Qty: 90 TABLET | Refills: 1 | OUTPATIENT
Start: 2024-12-12

## 2024-12-12 NOTE — TELEPHONE ENCOUNTER
Medication(s) requesting:   Requested Prescriptions     Pending Prescriptions Disp Refills    amLODIPine (NORVASC) 5 MG tablet [Pharmacy Med Name: AMLODIPINE BESYLATE 5 MG TAB] 90 tablet 1     Sig: TAKE 1 TABLET BY MOUTH EVERY DAY       Last office visit:  9.6.24  Next office visit DMA: 12/19/2024

## 2024-12-19 ENCOUNTER — OFFICE VISIT (OUTPATIENT)
Facility: CLINIC | Age: 69
End: 2024-12-19
Payer: COMMERCIAL

## 2024-12-19 VITALS
SYSTOLIC BLOOD PRESSURE: 156 MMHG | BODY MASS INDEX: 24.35 KG/M2 | DIASTOLIC BLOOD PRESSURE: 73 MMHG | WEIGHT: 124 LBS | HEART RATE: 94 BPM | HEIGHT: 60 IN | TEMPERATURE: 97.6 F | OXYGEN SATURATION: 92 %

## 2024-12-19 DIAGNOSIS — I10 PRIMARY HYPERTENSION: ICD-10-CM

## 2024-12-19 DIAGNOSIS — I50.812 CHRONIC RIGHT-SIDED CONGESTIVE HEART FAILURE (HCC): Primary | ICD-10-CM

## 2024-12-19 PROCEDURE — 99214 OFFICE O/P EST MOD 30 MIN: CPT

## 2024-12-19 PROCEDURE — 3078F DIAST BP <80 MM HG: CPT

## 2024-12-19 PROCEDURE — 1123F ACP DISCUSS/DSCN MKR DOCD: CPT

## 2024-12-19 PROCEDURE — 3077F SYST BP >= 140 MM HG: CPT

## 2024-12-19 RX ORDER — AMLODIPINE BESYLATE 10 MG/1
10 TABLET ORAL DAILY
Qty: 90 TABLET | Refills: 1 | Status: SHIPPED | OUTPATIENT
Start: 2024-12-19

## 2024-12-19 NOTE — PROGRESS NOTES
Heber Gomes is a 69 y.o. year old female who presents today for   Chief Complaint   Patient presents with    Follow-up      No colonoscopy on file  No cologuard on file  No FIT/FOBT on file   No flexible sigmoidoscopy on file           - DARREL Mckeon  [facility]  Phone: [phone]  Fax: [phone]

## 2025-01-07 ENCOUNTER — TELEPHONE (OUTPATIENT)
Facility: CLINIC | Age: 70
End: 2025-01-07

## 2025-01-07 NOTE — TELEPHONE ENCOUNTER
Dr. Joaquín kumari/ [facility] called in to confirm if the pt is currently taking Eliquis    Phone # [phone]

## 2025-01-11 DIAGNOSIS — I10 PRIMARY HYPERTENSION: ICD-10-CM

## 2025-01-13 RX ORDER — ASPIRIN 81 MG
TABLET,CHEWABLE ORAL
Qty: 30 TABLET | Refills: 5 | Status: SHIPPED | OUTPATIENT
Start: 2025-01-13

## 2025-01-13 RX ORDER — FUROSEMIDE 20 MG/1
20 TABLET ORAL DAILY
Qty: 30 TABLET | Refills: 5 | Status: SHIPPED | OUTPATIENT
Start: 2025-01-13 | End: 2025-01-16 | Stop reason: DRUGHIGH

## 2025-01-14 ASSESSMENT — ENCOUNTER SYMPTOMS
BLOOD IN STOOL: 0
EYES NEGATIVE: 1
COUGH: 0
VOMITING: 0
DIARRHEA: 0
CONSTIPATION: 0
ABDOMINAL PAIN: 0
SHORTNESS OF BREATH: 0
CHEST TIGHTNESS: 0
TROUBLE SWALLOWING: 0
NAUSEA: 0
SORE THROAT: 0
WHEEZING: 0

## 2025-01-14 NOTE — PROGRESS NOTES
Patient ID: Heber Gomes is a 69 y.o. female established patient presents for the following:      Subjective:     Primary historian: patient    Follow-up       HPI   She presents for follow-up of chronic conditions, denies any acute complaints.      No past medical history on file.    Past Surgical History:   Procedure Laterality Date    CATARACT EXTRACTION, BILATERAL      KIDNEY BIOPSY Left        Current Outpatient Medications   Medication Sig Dispense Refill    amLODIPine (NORVASC) 10 MG tablet Take 1 tablet by mouth daily 90 tablet 1    sertraline (ZOLOFT) 50 MG tablet TAKE 1 TABLET BY MOUTH ONCE DAILY FOR ANXIETY 90 tablet 1    furosemide (LASIX) 40 MG tablet TAKE 1 TABLET BY MOUTH TWICE A  tablet 1    metoprolol tartrate (LOPRESSOR) 25 MG tablet TAKE 1 TABLET BY MOUTH TWICE A DAY FOR HIGH BLOOD PRESSURE 180 tablet 1    apixaban (ELIQUIS) 2.5 MG TABS tablet Take 1 tablet by mouth daily 90 tablet 1    dulaglutide (TRULICITY) 0.75 MG/0.5ML SOPN SC injection Inject 0.5 mLs into the skin once a week 3 mL 3    ferrous sulfate (IRON 325) 325 (65 Fe) MG tablet       senna-docusate (PERICOLACE) 8.6-50 MG per tablet Take 1 tablet by mouth 2 times daily      atorvastatin (LIPITOR) 20 MG tablet Take 1 tablet by mouth daily 90 tablet 1    vitamin D 25 MCG (1000 UT) CAPS Take by mouth TAKE 1 TABLET BY MOUTH ONCE A DAY      furosemide (LASIX) 20 MG tablet TAKE 1 TABLET BY MOUTH EVERY DAY 30 tablet 5    ASPIRIN LOW DOSE 81 MG chewable tablet CHEW 1 TABLET BY MOUTH DAILY 30 tablet 5     No current facility-administered medications for this visit.          ROS   Review of Systems   Constitutional:  Negative for chills, fatigue and fever.   HENT:  Negative for ear pain, hearing loss, sore throat and trouble swallowing.    Eyes: Negative.    Respiratory:  Negative for cough, chest tightness, shortness of breath and wheezing.    Cardiovascular:  Negative for chest pain, palpitations and leg swelling.   Gastrointestinal:

## 2025-01-16 ENCOUNTER — OFFICE VISIT (OUTPATIENT)
Facility: CLINIC | Age: 70
End: 2025-01-16
Payer: COMMERCIAL

## 2025-01-16 VITALS
OXYGEN SATURATION: 91 % | BODY MASS INDEX: 21.51 KG/M2 | DIASTOLIC BLOOD PRESSURE: 70 MMHG | HEIGHT: 64 IN | TEMPERATURE: 98.3 F | HEART RATE: 90 BPM | SYSTOLIC BLOOD PRESSURE: 156 MMHG | WEIGHT: 126 LBS

## 2025-01-16 DIAGNOSIS — I50.812 CHRONIC RIGHT-SIDED CONGESTIVE HEART FAILURE (HCC): Primary | ICD-10-CM

## 2025-01-16 PROCEDURE — 99213 OFFICE O/P EST LOW 20 MIN: CPT

## 2025-01-16 PROCEDURE — 1123F ACP DISCUSS/DSCN MKR DOCD: CPT

## 2025-01-16 PROCEDURE — 3078F DIAST BP <80 MM HG: CPT

## 2025-01-16 PROCEDURE — 3077F SYST BP >= 140 MM HG: CPT

## 2025-01-16 RX ORDER — FUROSEMIDE 40 MG/1
40 TABLET ORAL DAILY
Qty: 180 TABLET | Refills: 1 | Status: SHIPPED | OUTPATIENT
Start: 2025-01-16

## 2025-01-16 SDOH — ECONOMIC STABILITY: FOOD INSECURITY: WITHIN THE PAST 12 MONTHS, YOU WORRIED THAT YOUR FOOD WOULD RUN OUT BEFORE YOU GOT MONEY TO BUY MORE.: NEVER TRUE

## 2025-01-16 SDOH — ECONOMIC STABILITY: FOOD INSECURITY: WITHIN THE PAST 12 MONTHS, THE FOOD YOU BOUGHT JUST DIDN'T LAST AND YOU DIDN'T HAVE MONEY TO GET MORE.: NEVER TRUE

## 2025-01-16 ASSESSMENT — PATIENT HEALTH QUESTIONNAIRE - PHQ9
SUM OF ALL RESPONSES TO PHQ QUESTIONS 1-9: 0
1. LITTLE INTEREST OR PLEASURE IN DOING THINGS: NOT AT ALL
SUM OF ALL RESPONSES TO PHQ QUESTIONS 1-9: 0
SUM OF ALL RESPONSES TO PHQ9 QUESTIONS 1 & 2: 0
SUM OF ALL RESPONSES TO PHQ QUESTIONS 1-9: 0
SUM OF ALL RESPONSES TO PHQ QUESTIONS 1-9: 0
2. FEELING DOWN, DEPRESSED OR HOPELESS: NOT AT ALL

## 2025-01-16 NOTE — PROGRESS NOTES
Patient ID: Heber Gomes is a 69 y.o. female established patient presents for the following:      Subjective:     Primary historian: patient    Follow-Up from Hospital       [facility]   She presents for her hospital follow-up visit.  She was seen on 1/5/2025 for acute on chronic heart failure.  She denies any acute complaints, no chest pain, no dyspnea    No past medical history on file.    Past Surgical History:   Procedure Laterality Date    CATARACT EXTRACTION, BILATERAL      KIDNEY BIOPSY Left        Current Outpatient Medications   Medication Sig Dispense Refill    furosemide (LASIX) 40 MG tablet Take 1 tablet by mouth daily 180 tablet 1    ASPIRIN LOW DOSE 81 MG chewable tablet CHEW 1 TABLET BY MOUTH DAILY 30 tablet 5    amLODIPine (NORVASC) 10 MG tablet Take 1 tablet by mouth daily 90 tablet 1    sertraline (ZOLOFT) 50 MG tablet TAKE 1 TABLET BY MOUTH ONCE DAILY FOR ANXIETY 90 tablet 1    metoprolol tartrate (LOPRESSOR) 25 MG tablet TAKE 1 TABLET BY MOUTH TWICE A DAY FOR HIGH BLOOD PRESSURE 180 tablet 1    apixaban (ELIQUIS) 2.5 MG TABS tablet Take 1 tablet by mouth daily 90 tablet 1    dulaglutide (TRULICITY) 0.75 MG/0.5ML SOPN SC injection Inject 0.5 mLs into the skin once a week 3 mL 3    ferrous sulfate (IRON 325) 325 (65 Fe) MG tablet       senna-docusate (PERICOLACE) 8.6-50 MG per tablet Take 1 tablet by mouth 2 times daily      atorvastatin (LIPITOR) 20 MG tablet Take 1 tablet by mouth daily 90 tablet 1    vitamin D 25 MCG (1000 UT) CAPS Take by mouth TAKE 1 TABLET BY MOUTH ONCE A DAY       No current facility-administered medications for this visit.          ROS   Review of Systems   Constitutional:  Negative for chills, fatigue and fever.   HENT:  Negative for ear pain, hearing loss, sore throat and trouble swallowing.    Eyes: Negative.    Respiratory:  Negative for cough, chest tightness, shortness of breath and wheezing.    Cardiovascular:  Negative for chest pain, palpitations and leg swelling.

## 2025-01-16 NOTE — PROGRESS NOTES
Heber Gomes is a 69 y.o. year old female who presents today for   Chief Complaint   Patient presents with    Follow-Up from Hospital        \"Have you been to the ER, urgent care clinic since your last visit?  Hospitalized since your last visit?\"   YES ON 1/5 HEART FAILURE     “Have you seen or consulted any other health care providers outside our system since your last visit?”   NO       “Have you had a colorectal cancer screening such as a colonoscopy/FIT/Cologuard?    NO    No colonoscopy on file  No cologuard on file  No FIT/FOBT on file   No flexible sigmoidoscopy on file           - DARREL Mckeon  [facility]  Phone: [phone]  Fax: [phone]

## 2025-02-04 ASSESSMENT — ENCOUNTER SYMPTOMS
SORE THROAT: 0
COUGH: 0
SHORTNESS OF BREATH: 0
CONSTIPATION: 0
DIARRHEA: 0
VOMITING: 0
NAUSEA: 0
ABDOMINAL PAIN: 0
BLOOD IN STOOL: 0
TROUBLE SWALLOWING: 0
EYES NEGATIVE: 1
CHEST TIGHTNESS: 0
WHEEZING: 0

## 2025-02-17 LAB
BASOPHILS # BLD AUTO: 0.1 X10E3/UL (ref 0–0.2)
BASOPHILS NFR BLD AUTO: 1 %
EOSINOPHIL # BLD AUTO: 0.1 X10E3/UL (ref 0–0.4)
EOSINOPHIL NFR BLD AUTO: 1 %
ERYTHROCYTE [DISTWIDTH] IN BLOOD BY AUTOMATED COUNT: 13.1 % (ref 11.7–15.4)
HCT VFR BLD AUTO: 20.4 % (ref 34–46.6)
HGB BLD-MCNC: 6.5 G/DL (ref 11.1–15.9)
IMM GRANULOCYTES # BLD AUTO: 0 X10E3/UL (ref 0–0.1)
IMM GRANULOCYTES NFR BLD AUTO: 0 %
LYMPHOCYTES # BLD AUTO: 1.4 X10E3/UL (ref 0.7–3.1)
LYMPHOCYTES NFR BLD AUTO: 10 %
MCH RBC QN AUTO: 31 PG (ref 26.6–33)
MCHC RBC AUTO-ENTMCNC: 31.9 G/DL (ref 31.5–35.7)
MCV RBC AUTO: 97 FL (ref 79–97)
MONOCYTES # BLD AUTO: 1.1 X10E3/UL (ref 0.1–0.9)
MONOCYTES NFR BLD AUTO: 8 %
NEUTROPHILS # BLD AUTO: 10.9 X10E3/UL (ref 1.4–7)
NEUTROPHILS NFR BLD AUTO: 80 %
PLATELET # BLD AUTO: 185 X10E3/UL (ref 150–450)
RBC # BLD AUTO: 2.1 X10E6/UL (ref 3.77–5.28)
WBC # BLD AUTO: 13.6 X10E3/UL (ref 3.4–10.8)

## 2025-02-18 LAB
ALBUMIN SERPL-MCNC: 4.7 G/DL (ref 3.9–4.9)
ALP SERPL-CCNC: 187 IU/L (ref 44–121)
ALT SERPL-CCNC: 19 IU/L (ref 0–32)
APPEARANCE UR: CLEAR
AST SERPL-CCNC: 37 IU/L (ref 0–40)
BACTERIA #/AREA URNS HPF: NORMAL /[HPF]
BILIRUB SERPL-MCNC: 0.4 MG/DL (ref 0–1.2)
BILIRUB UR QL STRIP: NEGATIVE
BUN SERPL-MCNC: 64 MG/DL (ref 8–27)
BUN/CREAT SERPL: 16 (ref 12–28)
CALCIUM SERPL-MCNC: 8.9 MG/DL (ref 8.7–10.3)
CASTS URNS QL MICRO: NORMAL /LPF
CHLORIDE SERPL-SCNC: 102 MMOL/L (ref 96–106)
CO2 SERPL-SCNC: 18 MMOL/L (ref 20–29)
COLOR UR: YELLOW
CREAT SERPL-MCNC: 3.9 MG/DL (ref 0.57–1)
EGFRCR SERPLBLD CKD-EPI 2021: 12 ML/MIN/1.73
EPI CELLS #/AREA URNS HPF: NORMAL /HPF (ref 0–10)
GLOBULIN SER CALC-MCNC: 3 G/DL (ref 1.5–4.5)
GLUCOSE SERPL-MCNC: 158 MG/DL (ref 70–99)
GLUCOSE UR QL STRIP: NEGATIVE
HBA1C MFR BLD: 5 % (ref 4.8–5.6)
HGB UR QL STRIP: NEGATIVE
KETONES UR QL STRIP: NEGATIVE
LEUKOCYTE ESTERASE UR QL STRIP: NEGATIVE
MICRO URNS: ABNORMAL
NITRITE UR QL STRIP: NEGATIVE
PH UR STRIP: 7 [PH] (ref 5–7.5)
POTASSIUM SERPL-SCNC: 5 MMOL/L (ref 3.5–5.2)
PROT SERPL-MCNC: 7.7 G/DL (ref 6–8.5)
PROT UR QL STRIP: ABNORMAL
RBC #/AREA URNS HPF: NORMAL /HPF (ref 0–2)
SODIUM SERPL-SCNC: 141 MMOL/L (ref 134–144)
SP GR UR STRIP: 1.01 (ref 1–1.03)
UROBILINOGEN UR STRIP-MCNC: 0.2 MG/DL (ref 0.2–1)
WBC #/AREA URNS HPF: NORMAL /HPF (ref 0–5)

## 2025-02-26 ENCOUNTER — TELEPHONE (OUTPATIENT)
Facility: CLINIC | Age: 70
End: 2025-02-26

## 2025-03-03 RX ORDER — ATORVASTATIN CALCIUM 20 MG/1
20 TABLET, FILM COATED ORAL DAILY
Qty: 90 TABLET | Refills: 1 | Status: SHIPPED | OUTPATIENT
Start: 2025-03-03

## 2025-03-06 ENCOUNTER — OFFICE VISIT (OUTPATIENT)
Facility: CLINIC | Age: 70
End: 2025-03-06
Payer: COMMERCIAL

## 2025-03-06 VITALS
DIASTOLIC BLOOD PRESSURE: 56 MMHG | BODY MASS INDEX: 21 KG/M2 | TEMPERATURE: 97.3 F | SYSTOLIC BLOOD PRESSURE: 146 MMHG | HEIGHT: 64 IN | WEIGHT: 123 LBS | OXYGEN SATURATION: 90 % | HEART RATE: 95 BPM

## 2025-03-06 DIAGNOSIS — I10 PRIMARY HYPERTENSION: Primary | ICD-10-CM

## 2025-03-06 PROCEDURE — 3077F SYST BP >= 140 MM HG: CPT

## 2025-03-06 PROCEDURE — 99214 OFFICE O/P EST MOD 30 MIN: CPT

## 2025-03-06 PROCEDURE — 3078F DIAST BP <80 MM HG: CPT

## 2025-03-06 PROCEDURE — 1123F ACP DISCUSS/DSCN MKR DOCD: CPT

## 2025-03-06 RX ORDER — METOPROLOL TARTRATE 50 MG
50 TABLET ORAL 2 TIMES DAILY
Qty: 60 TABLET | Refills: 5 | Status: SHIPPED | OUTPATIENT
Start: 2025-03-06

## 2025-03-06 NOTE — PROGRESS NOTES
Heber Gomes is a 69 y.o. year old female who presents today for   Chief Complaint   Patient presents with    Follow-up        \"Have you been to the ER, urgent care clinic since your last visit?  Hospitalized since your last visit?\"   NO     “Have you seen or consulted any other health care providers outside our system since your last visit?”   NO       “Have you had a colorectal cancer screening such as a colonoscopy/FIT/Cologuard?    NO    No colonoscopy on file  No cologuard on file  No FIT/FOBT on file   No flexible sigmoidoscopy on file     “Have you had a diabetic eye exam?”    NO     Date of last diabetic eye exam: 3/4/2024           - DARREL Mckeon  [facility]  Phone: [phone]  Fax: [phone]

## 2025-03-10 ASSESSMENT — ENCOUNTER SYMPTOMS
TROUBLE SWALLOWING: 0
SHORTNESS OF BREATH: 0
DIARRHEA: 0
WHEEZING: 0
VOMITING: 0
NAUSEA: 0
ABDOMINAL PAIN: 0
SORE THROAT: 0
BLOOD IN STOOL: 0
CONSTIPATION: 0
COUGH: 0
CHEST TIGHTNESS: 0
EYES NEGATIVE: 1

## 2025-03-10 NOTE — PROGRESS NOTES
Patient ID: Heber Gomes is a 69 y.o. female established patient presents for the following:      Subjective:     Primary historian: patient    Follow-up       HPI   she  presents for follow-up of lab review, denies any acute complaints.      No past medical history on file.    Past Surgical History:   Procedure Laterality Date    CATARACT EXTRACTION, BILATERAL      KIDNEY BIOPSY Left        Current Outpatient Medications   Medication Sig Dispense Refill    metoprolol tartrate (LOPRESSOR) 50 MG tablet Take 1 tablet by mouth 2 times daily 60 tablet 5    atorvastatin (LIPITOR) 20 MG tablet TAKE 1 TABLET BY MOUTH EVERY DAY 90 tablet 1    furosemide (LASIX) 40 MG tablet Take 1 tablet by mouth daily 180 tablet 1    ASPIRIN LOW DOSE 81 MG chewable tablet CHEW 1 TABLET BY MOUTH DAILY 30 tablet 5    amLODIPine (NORVASC) 10 MG tablet Take 1 tablet by mouth daily 90 tablet 1    sertraline (ZOLOFT) 50 MG tablet TAKE 1 TABLET BY MOUTH ONCE DAILY FOR ANXIETY 90 tablet 1    apixaban (ELIQUIS) 2.5 MG TABS tablet Take 1 tablet by mouth daily 90 tablet 1    dulaglutide (TRULICITY) 0.75 MG/0.5ML SOPN SC injection Inject 0.5 mLs into the skin once a week 3 mL 3    ferrous sulfate (IRON 325) 325 (65 Fe) MG tablet       senna-docusate (PERICOLACE) 8.6-50 MG per tablet Take 1 tablet by mouth 2 times daily      vitamin D 25 MCG (1000 UT) CAPS Take by mouth TAKE 1 TABLET BY MOUTH ONCE A DAY       No current facility-administered medications for this visit.          ROS   Review of Systems   Constitutional:  Negative for chills, fatigue and fever.   HENT:  Negative for ear pain, hearing loss, sore throat and trouble swallowing.    Eyes: Negative.    Respiratory:  Negative for cough, chest tightness, shortness of breath and wheezing.    Cardiovascular:  Negative for chest pain, palpitations and leg swelling.   Gastrointestinal:  Negative for abdominal pain, blood in stool, constipation, diarrhea, nausea and vomiting.   Endocrine: Negative for

## 2025-04-09 ENCOUNTER — OFFICE VISIT (OUTPATIENT)
Facility: CLINIC | Age: 70
End: 2025-04-09
Payer: COMMERCIAL

## 2025-04-09 ENCOUNTER — TELEPHONE (OUTPATIENT)
Facility: CLINIC | Age: 70
End: 2025-04-09

## 2025-04-09 VITALS
TEMPERATURE: 97.4 F | OXYGEN SATURATION: 99 % | WEIGHT: 127 LBS | SYSTOLIC BLOOD PRESSURE: 121 MMHG | BODY MASS INDEX: 21.16 KG/M2 | DIASTOLIC BLOOD PRESSURE: 70 MMHG | HEIGHT: 65 IN | HEART RATE: 100 BPM

## 2025-04-09 DIAGNOSIS — I10 PRIMARY HYPERTENSION: ICD-10-CM

## 2025-04-09 DIAGNOSIS — D64.9 ANEMIA, UNSPECIFIED TYPE: Primary | ICD-10-CM

## 2025-04-09 DIAGNOSIS — Z12.11 COLON CANCER SCREENING: ICD-10-CM

## 2025-04-09 PROCEDURE — 99214 OFFICE O/P EST MOD 30 MIN: CPT

## 2025-04-09 PROCEDURE — 3074F SYST BP LT 130 MM HG: CPT

## 2025-04-09 PROCEDURE — 3078F DIAST BP <80 MM HG: CPT

## 2025-04-09 PROCEDURE — 1123F ACP DISCUSS/DSCN MKR DOCD: CPT

## 2025-04-09 NOTE — PROGRESS NOTES
Heber Gomes is a 69 y.o. year old female who presents today for   Chief Complaint   Patient presents with    Follow-up    Hypertension    Anemia        \"Have you been to the ER, urgent care clinic since your last visit?  Hospitalized since your last visit?\"   NO     “Have you seen or consulted any other health care providers outside our system since your last visit?”   NO       “Have you had a colorectal cancer screening such as a colonoscopy/FIT/Cologuard?    NO    No colonoscopy on file  No cologuard on file  No FIT/FOBT on file   No flexible sigmoidoscopy on file     “Have you had a diabetic eye exam?”    NO     Date of last diabetic eye exam: 3/4/2024           - DARREL Mckeon  [facility]  Phone: [phone]  Fax: [phone]

## 2025-04-20 LAB
BASOPHILS # BLD AUTO: 0.1 X10E3/UL (ref 0–0.2)
BASOPHILS NFR BLD AUTO: 1 %
EOSINOPHIL # BLD AUTO: 0.1 X10E3/UL (ref 0–0.4)
EOSINOPHIL NFR BLD AUTO: 2 %
ERYTHROCYTE [DISTWIDTH] IN BLOOD BY AUTOMATED COUNT: 13.6 % (ref 11.7–15.4)
FERRITIN SERPL-MCNC: 362 NG/ML (ref 15–150)
HCT VFR BLD AUTO: 25 % (ref 34–46.6)
HGB BLD-MCNC: 7.5 G/DL (ref 11.1–15.9)
IMM GRANULOCYTES # BLD AUTO: 0 X10E3/UL (ref 0–0.1)
IMM GRANULOCYTES NFR BLD AUTO: 0 %
IRON SATN MFR SERPL: 24 % (ref 15–55)
IRON SERPL-MCNC: 78 UG/DL (ref 27–139)
LYMPHOCYTES # BLD AUTO: 1.2 X10E3/UL (ref 0.7–3.1)
LYMPHOCYTES NFR BLD AUTO: 26 %
MCH RBC QN AUTO: 28.4 PG (ref 26.6–33)
MCHC RBC AUTO-ENTMCNC: 30 G/DL (ref 31.5–35.7)
MCV RBC AUTO: 95 FL (ref 79–97)
MONOCYTES # BLD AUTO: 0.6 X10E3/UL (ref 0.1–0.9)
MONOCYTES NFR BLD AUTO: 12 %
NEUTROPHILS # BLD AUTO: 2.8 X10E3/UL (ref 1.4–7)
NEUTROPHILS NFR BLD AUTO: 59 %
PLATELET # BLD AUTO: 157 X10E3/UL (ref 150–450)
RBC # BLD AUTO: 2.64 X10E6/UL (ref 3.77–5.28)
TIBC SERPL-MCNC: 325 UG/DL (ref 250–450)
UIBC SERPL-MCNC: 247 UG/DL (ref 118–369)
WBC # BLD AUTO: 4.8 X10E3/UL (ref 3.4–10.8)

## 2025-04-21 ASSESSMENT — ENCOUNTER SYMPTOMS
WHEEZING: 0
CHEST TIGHTNESS: 0
CONSTIPATION: 0
VOMITING: 0
COUGH: 0
TROUBLE SWALLOWING: 0
NAUSEA: 0
DIARRHEA: 0
SHORTNESS OF BREATH: 0
BLOOD IN STOOL: 0
ABDOMINAL PAIN: 0
SORE THROAT: 0
EYES NEGATIVE: 1

## 2025-04-21 NOTE — PROGRESS NOTES
Patient ID: Heber Gomes is a 69 y.o. female established patient presents for the following:      Subjective:     Primary historian: patient    Follow-up, Hypertension, and Anemia       Hypertension  Pertinent negatives include no chest pain, palpitations or shortness of breath.   Anemia  There has been no abdominal pain, confusion, fever or palpitations. Signs of blood loss that are not present include vaginal bleeding.      she  presents for follow-up of lab review, denies any acute complaints.      History reviewed. No pertinent past medical history.    Past Surgical History:   Procedure Laterality Date    CATARACT EXTRACTION, BILATERAL      KIDNEY BIOPSY Left        Current Outpatient Medications   Medication Sig Dispense Refill    sodium zirconium cyclosilicate (LOKELMA) 10 g PACK oral suspension Take 1 packet by mouth 2 times daily 6 each 0    metoprolol tartrate (LOPRESSOR) 50 MG tablet Take 1 tablet by mouth 2 times daily 60 tablet 5    atorvastatin (LIPITOR) 20 MG tablet TAKE 1 TABLET BY MOUTH EVERY DAY 90 tablet 1    furosemide (LASIX) 40 MG tablet Take 1 tablet by mouth daily 180 tablet 1    ASPIRIN LOW DOSE 81 MG chewable tablet CHEW 1 TABLET BY MOUTH DAILY 30 tablet 5    amLODIPine (NORVASC) 10 MG tablet Take 1 tablet by mouth daily 90 tablet 1    sertraline (ZOLOFT) 50 MG tablet TAKE 1 TABLET BY MOUTH ONCE DAILY FOR ANXIETY 90 tablet 1    apixaban (ELIQUIS) 2.5 MG TABS tablet Take 1 tablet by mouth daily 90 tablet 1    dulaglutide (TRULICITY) 0.75 MG/0.5ML SOPN SC injection Inject 0.5 mLs into the skin once a week 3 mL 3    ferrous sulfate (IRON 325) 325 (65 Fe) MG tablet       senna-docusate (PERICOLACE) 8.6-50 MG per tablet Take 1 tablet by mouth 2 times daily      vitamin D 25 MCG (1000 UT) CAPS Take by mouth TAKE 1 TABLET BY MOUTH ONCE A DAY       No current facility-administered medications for this visit.          ROS   Review of Systems   Constitutional:  Negative for chills, fatigue and fever.

## 2025-04-24 ENCOUNTER — TELEMEDICINE (OUTPATIENT)
Facility: CLINIC | Age: 70
End: 2025-04-24
Payer: COMMERCIAL

## 2025-04-24 DIAGNOSIS — R79.89 HIGH SERUM FERRITIN: Primary | ICD-10-CM

## 2025-04-24 PROCEDURE — 99214 OFFICE O/P EST MOD 30 MIN: CPT

## 2025-04-24 PROCEDURE — 1123F ACP DISCUSS/DSCN MKR DOCD: CPT

## 2025-04-24 ASSESSMENT — ENCOUNTER SYMPTOMS
WHEEZING: 0
ABDOMINAL PAIN: 0
COUGH: 0
BLOOD IN STOOL: 0
VOMITING: 0
TROUBLE SWALLOWING: 0
EYES NEGATIVE: 1
CONSTIPATION: 0
CHEST TIGHTNESS: 0
SHORTNESS OF BREATH: 0
NAUSEA: 0
DIARRHEA: 0
SORE THROAT: 0

## 2025-04-24 NOTE — PROGRESS NOTES
Heber Gomes (: 1955) is a 69 y.o. female, established  patient, here for evaluation of the following:      Subjective:     Primary historian: patient    Other       HPI  she  presents for follow-up of lab review, denies any acute complaints.      No past medical history on file.     Past Surgical History:   Procedure Laterality Date    CATARACT EXTRACTION, BILATERAL      KIDNEY BIOPSY Left         Current Outpatient Medications   Medication Sig Dispense Refill    sodium zirconium cyclosilicate (LOKELMA) 10 g PACK oral suspension Take 1 packet by mouth 2 times daily 6 each 0    metoprolol tartrate (LOPRESSOR) 50 MG tablet Take 1 tablet by mouth 2 times daily 60 tablet 5    atorvastatin (LIPITOR) 20 MG tablet TAKE 1 TABLET BY MOUTH EVERY DAY 90 tablet 1    furosemide (LASIX) 40 MG tablet Take 1 tablet by mouth daily 180 tablet 1    ASPIRIN LOW DOSE 81 MG chewable tablet CHEW 1 TABLET BY MOUTH DAILY 30 tablet 5    amLODIPine (NORVASC) 10 MG tablet Take 1 tablet by mouth daily 90 tablet 1    sertraline (ZOLOFT) 50 MG tablet TAKE 1 TABLET BY MOUTH ONCE DAILY FOR ANXIETY 90 tablet 1    apixaban (ELIQUIS) 2.5 MG TABS tablet Take 1 tablet by mouth daily 90 tablet 1    dulaglutide (TRULICITY) 0.75 MG/0.5ML SOPN SC injection Inject 0.5 mLs into the skin once a week 3 mL 3    ferrous sulfate (IRON 325) 325 (65 Fe) MG tablet       senna-docusate (PERICOLACE) 8.6-50 MG per tablet Take 1 tablet by mouth 2 times daily      vitamin D 25 MCG (1000 UT) CAPS Take by mouth TAKE 1 TABLET BY MOUTH ONCE A DAY       No current facility-administered medications for this visit.       ROS:    Review of Systems   Constitutional:  Negative for chills, fatigue and fever.   HENT:  Negative for ear pain, hearing loss, sore throat and trouble swallowing.    Eyes: Negative.    Respiratory:  Negative for cough, chest tightness, shortness of breath and wheezing.    Cardiovascular:  Negative for chest pain, palpitations and leg swelling.

## 2025-05-08 ENCOUNTER — TELEPHONE (OUTPATIENT)
Facility: CLINIC | Age: 70
End: 2025-05-08

## 2025-05-08 NOTE — TELEPHONE ENCOUNTER
Patient daughter Lorrie called in stating that her mom was seen at [facility]. Patient was seen on May 6 and discharged the following day. Patient daughter will like to know if patient can be prescribed a portable oxygen machine. I did suggest that patient follow up in office. However, daughter mentioned that patient has a lot of appointments coming up for the next few days. Please follow up with patient daughter if patient is needing to follow up in office. Please be advised, thank you.

## 2025-05-09 ENCOUNTER — OFFICE VISIT (OUTPATIENT)
Facility: CLINIC | Age: 70
End: 2025-05-09

## 2025-05-09 VITALS
OXYGEN SATURATION: 98 % | WEIGHT: 127.8 LBS | BODY MASS INDEX: 21.29 KG/M2 | TEMPERATURE: 98 F | SYSTOLIC BLOOD PRESSURE: 131 MMHG | HEART RATE: 63 BPM | DIASTOLIC BLOOD PRESSURE: 70 MMHG | HEIGHT: 65 IN | RESPIRATION RATE: 20 BRPM

## 2025-05-09 DIAGNOSIS — Z09 HOSPITAL DISCHARGE FOLLOW-UP: ICD-10-CM

## 2025-05-09 DIAGNOSIS — I50.812 CHRONIC RIGHT-SIDED CONGESTIVE HEART FAILURE (HCC): Primary | ICD-10-CM

## 2025-05-09 ASSESSMENT — PATIENT HEALTH QUESTIONNAIRE - PHQ9
SUM OF ALL RESPONSES TO PHQ QUESTIONS 1-9: 0
SUM OF ALL RESPONSES TO PHQ QUESTIONS 1-9: 0
1. LITTLE INTEREST OR PLEASURE IN DOING THINGS: NOT AT ALL
SUM OF ALL RESPONSES TO PHQ QUESTIONS 1-9: 0
SUM OF ALL RESPONSES TO PHQ QUESTIONS 1-9: 0
2. FEELING DOWN, DEPRESSED OR HOPELESS: NOT AT ALL

## 2025-05-09 NOTE — PROGRESS NOTES
Heber Gomes is a 69 y.o. year old female who presents today for   Chief Complaint   Patient presents with    Follow-Up from Hospital     Shortness of breath (Admit 5/6/25, DC 5/7/25)       \"Have you been to the ER, urgent care clinic since your last visit?  Hospitalized since your last visit?\"    ER for shortness of breath     “Have you seen or consulted any other health care providers outside our system since your last visit?”    no      “Have you had a colorectal cancer screening such as a colonoscopy/FIT/Cologuard?    no    No colonoscopy on file  No cologuard on file  No FIT/FOBT on file   No flexible sigmoidoscopy on file     “Have you had a diabetic eye exam?”    NO     Date of last diabetic eye exam: 3/4/2024         Click Here for Release of Records Request    - Bekah Cullipher, LPN  [facility]  [facility]  Phone: [phone]  Fax: [phone]

## 2025-05-27 RX ORDER — APIXABAN 2.5 MG/1
2.5 TABLET, FILM COATED ORAL DAILY
Qty: 90 TABLET | Refills: 1 | Status: SHIPPED | OUTPATIENT
Start: 2025-05-27

## 2025-05-27 NOTE — TELEPHONE ENCOUNTER
Medication(s) requesting:   Requested Prescriptions     Pending Prescriptions Disp Refills    ELIQUIS 2.5 MG TABS tablet [Pharmacy Med Name: ELIQUIS 2.5 MG TABLET] 90 tablet 1     Sig: TAKE 1 TABLET BY MOUTH EVERY DAY       Last office visit:  05/09/25  Next office visit DMA: 6/9/2025

## 2025-06-09 ENCOUNTER — TELEMEDICINE (OUTPATIENT)
Facility: CLINIC | Age: 70
End: 2025-06-09
Payer: MEDICAID

## 2025-06-09 ENCOUNTER — TELEPHONE (OUTPATIENT)
Facility: CLINIC | Age: 70
End: 2025-06-09

## 2025-06-09 DIAGNOSIS — I10 PRIMARY HYPERTENSION: ICD-10-CM

## 2025-06-09 DIAGNOSIS — E11.22 TYPE 2 DIABETES MELLITUS WITH CHRONIC KIDNEY DISEASE, WITHOUT LONG-TERM CURRENT USE OF INSULIN, UNSPECIFIED CKD STAGE (HCC): Primary | ICD-10-CM

## 2025-06-09 DIAGNOSIS — Z99.81 OXYGEN DEPENDENT: ICD-10-CM

## 2025-06-09 PROCEDURE — 99214 OFFICE O/P EST MOD 30 MIN: CPT

## 2025-06-09 PROCEDURE — 1123F ACP DISCUSS/DSCN MKR DOCD: CPT

## 2025-06-09 PROCEDURE — 3044F HG A1C LEVEL LT 7.0%: CPT

## 2025-06-09 RX ORDER — LISINOPRIL 2.5 MG/1
1 TABLET ORAL DAILY
COMMUNITY
Start: 2025-05-30

## 2025-06-09 RX ORDER — OXYCODONE AND ACETAMINOPHEN 5; 325 MG/1; MG/1
1 TABLET ORAL EVERY 6 HOURS PRN
COMMUNITY
Start: 2025-05-27

## 2025-06-09 RX ORDER — CALCITRIOL 0.25 UG/1
0.25 CAPSULE, LIQUID FILLED ORAL EVERY OTHER DAY
COMMUNITY
Start: 2025-02-19

## 2025-06-09 NOTE — TELEPHONE ENCOUNTER
Insurance company called to follow up on order for O2 Concentrator from early May. Pt has still not received it. I informed insurance that I did see the order in the chart, but it did not indicate where it was being sent. Please advise on which company or pharmacy was filling the order?

## 2025-06-09 NOTE — PROGRESS NOTES
Heber Gomes is a 69 y.o. year old female who presents today for   Chief Complaint   Patient presents with    Hypertension       \"Have you been to the ER, urgent care clinic since your last visit?  Hospitalized since your last visit?\"    no    “Have you seen or consulted any other health care providers outside our system since your last visit?”    no      “Have you had a colorectal cancer screening such as a colonoscopy/FIT/Cologuard?    NO    No colonoscopy on file  No cologuard on file  No FIT/FOBT on file   No flexible sigmoidoscopy on file     “Have you had a diabetic eye exam?”    NO     Date of last diabetic eye exam: 3/4/2024         Click Here for Release of Records Request    - Bekah Cullipher, LPN  [facility]  [facility]  Phone: [phone]  Fax: [phone]

## 2025-06-16 DIAGNOSIS — E11.22 TYPE 2 DIABETES MELLITUS WITH CHRONIC KIDNEY DISEASE, WITHOUT LONG-TERM CURRENT USE OF INSULIN, UNSPECIFIED CKD STAGE (HCC): ICD-10-CM

## 2025-06-16 NOTE — TELEPHONE ENCOUNTER
Medication(s) requesting:   Requested Prescriptions     Pending Prescriptions Disp Refills    sertraline (ZOLOFT) 50 MG tablet [Pharmacy Med Name: SERTRALINE HCL 50 MG TABLET] 90 tablet 1     Sig: TAKE 1 TABLET BY MOUTH ONCE DAILY FOR ANXIETY       Last office visit:  6/9/2025  Next office visit DMA: 10/24/2025

## 2025-06-18 PROBLEM — Z99.81 OXYGEN DEPENDENT: Status: ACTIVE | Noted: 2025-06-18

## 2025-06-18 ASSESSMENT — ENCOUNTER SYMPTOMS
WHEEZING: 0
SORE THROAT: 0
DIARRHEA: 0
VOMITING: 0
SHORTNESS OF BREATH: 0
TROUBLE SWALLOWING: 0
EYES NEGATIVE: 1
BLOOD IN STOOL: 0
ABDOMINAL PAIN: 0
CHEST TIGHTNESS: 0
NAUSEA: 0
CONSTIPATION: 0
COUGH: 0

## 2025-06-18 NOTE — PROGRESS NOTES
Heber Gomes (: 1955) is a 69 y.o. female, established  patient, here for evaluation of the following:      Subjective:     Primary historian: patient    Hypertension       Hypertension  Pertinent negatives include no chest pain, palpitations or shortness of breath.       No past medical history on file.     Past Surgical History:   Procedure Laterality Date    CATARACT EXTRACTION, BILATERAL      KIDNEY BIOPSY Left         Current Outpatient Medications   Medication Sig Dispense Refill    calcitRIOL (ROCALTROL) 0.25 MCG capsule Take 1 capsule by mouth every other day Monday, Wednesday, Friday      lisinopril (PRINIVIL;ZESTRIL) 2.5 MG tablet Take 1 tablet by mouth daily      oxyCODONE-acetaminophen (PERCOCET) 5-325 MG per tablet Take 1 tablet by mouth every 6 hours as needed for Pain.      dulaglutide (TRULICITY) 1.5 MG/0.5ML SC injection Inject 0.5 mLs into the skin every 7 days 2 mL 3    ELIQUIS 2.5 MG TABS tablet TAKE 1 TABLET BY MOUTH EVERY DAY 90 tablet 1    sodium zirconium cyclosilicate (LOKELMA) 10 g PACK oral suspension Take 1 packet by mouth 2 times daily 6 each 0    metoprolol tartrate (LOPRESSOR) 50 MG tablet Take 1 tablet by mouth 2 times daily 60 tablet 5    atorvastatin (LIPITOR) 20 MG tablet TAKE 1 TABLET BY MOUTH EVERY DAY 90 tablet 1    furosemide (LASIX) 40 MG tablet Take 1 tablet by mouth daily 180 tablet 1    ASPIRIN LOW DOSE 81 MG chewable tablet CHEW 1 TABLET BY MOUTH DAILY 30 tablet 5    amLODIPine (NORVASC) 10 MG tablet Take 1 tablet by mouth daily 90 tablet 1    ferrous sulfate (IRON 325) 325 (65 Fe) MG tablet       senna-docusate (PERICOLACE) 8.6-50 MG per tablet Take 1 tablet by mouth 2 times daily      vitamin D 25 MCG (1000 UT) CAPS Take by mouth TAKE 1 TABLET BY MOUTH ONCE A DAY      sertraline (ZOLOFT) 50 MG tablet TAKE 1 TABLET BY MOUTH ONCE DAILY FOR ANXIETY 90 tablet 1     No current facility-administered medications for this visit.       ROS:    Review of Systems

## 2025-06-19 DIAGNOSIS — E11.22 TYPE 2 DIABETES MELLITUS WITH CHRONIC KIDNEY DISEASE, WITHOUT LONG-TERM CURRENT USE OF INSULIN, UNSPECIFIED CKD STAGE (HCC): ICD-10-CM

## 2025-06-19 NOTE — TELEPHONE ENCOUNTER
Prior authorization for increased dosage of Trulicity approved by insurance today, 6/19/2025.    Medication(s) requesting:   Requested Prescriptions     Pending Prescriptions Disp Refills    dulaglutide (TRULICITY) 1.5 MG/0.5ML SC injection 2 mL 3     Sig: Inject 0.5 mLs into the skin every 7 days    calcitRIOL (ROCALTROL) 0.25 MCG capsule 30 capsule      Sig: Take 1 capsule by mouth every other day Monday, Wednesday, Friday       Last office visit:  6/9/2025  Next office visit DMA: 10/24/2025

## 2025-06-19 NOTE — TELEPHONE ENCOUNTER
Patient daughter Lorrie called in requesting medication refill on the following:     Requested Prescriptions     Pending Prescriptions Disp Refills    dulaglutide (TRULICITY) 1.5 MG/0.5ML SC injection 2 mL 3     Sig: Inject 0.5 mLs into the skin every 7 days    calcitRIOL (ROCALTROL) 0.25 MCG capsule 30 capsule      Sig: Take 1 capsule by mouth every other day Monday, Wednesday, Friday     LOV: 6/9/25   NOV: 10/24/25     Please be advised, thank you.

## 2025-06-23 NOTE — TELEPHONE ENCOUNTER
Patient's insurance approved coverage for Trulicity 1.5mg/0.5ml pens from 6/19/2025 through to 6/19/2026.

## 2025-06-24 RX ORDER — CALCITRIOL 0.25 UG/1
0.25 CAPSULE, LIQUID FILLED ORAL EVERY OTHER DAY
Qty: 30 CAPSULE | Refills: 5 | Status: SHIPPED | OUTPATIENT
Start: 2025-06-24

## 2025-07-21 RX ORDER — ATORVASTATIN CALCIUM 20 MG/1
20 TABLET, FILM COATED ORAL DAILY
Qty: 90 TABLET | Refills: 1 | Status: SHIPPED | OUTPATIENT
Start: 2025-07-21

## 2025-07-21 NOTE — TELEPHONE ENCOUNTER
Medication(s) requesting:   Requested Prescriptions     Pending Prescriptions Disp Refills    atorvastatin (LIPITOR) 20 MG tablet [Pharmacy Med Name: ATORVASTATIN 20 MG TABLET] 90 tablet 1     Sig: TAKE 1 TABLET BY MOUTH EVERY DAY       Last office visit:  06/09/2025  Next office visit DMA: 10/24/2025